# Patient Record
Sex: MALE | Race: WHITE | Employment: OTHER | URBAN - NONMETROPOLITAN AREA
[De-identification: names, ages, dates, MRNs, and addresses within clinical notes are randomized per-mention and may not be internally consistent; named-entity substitution may affect disease eponyms.]

---

## 2019-05-31 ENCOUNTER — HOSPITAL ENCOUNTER (OUTPATIENT)
Dept: GENERAL RADIOLOGY | Age: 54
Discharge: HOME | End: 2019-05-31
Payer: MEDICARE

## 2019-05-31 DIAGNOSIS — F17.200 TOBACCO USE DISORDER: ICD-10-CM

## 2019-05-31 PROCEDURE — 71046 X-RAY EXAM CHEST 2 VIEWS: CPT

## 2019-09-26 VITALS
BODY MASS INDEX: 17.23 KG/M2 | WEIGHT: 130 LBS | SYSTOLIC BLOOD PRESSURE: 100 MMHG | DIASTOLIC BLOOD PRESSURE: 60 MMHG | HEIGHT: 73 IN | HEART RATE: 69 BPM

## 2019-09-26 DIAGNOSIS — F17.200 NICOTINE DEPENDENCE, UNCOMPLICATED, UNSPECIFIED NICOTINE PRODUCT TYPE: ICD-10-CM

## 2019-09-26 DIAGNOSIS — D69.6 THROMBOCYTOPENIA, UNSPECIFIED (HCC): ICD-10-CM

## 2019-09-26 DIAGNOSIS — D75.1 POLYCYTHEMIA, SECONDARY: ICD-10-CM

## 2019-09-26 DIAGNOSIS — Z12.5 ENCOUNTER FOR SCREENING FOR MALIGNANT NEOPLASM OF PROSTATE: ICD-10-CM

## 2019-09-26 DIAGNOSIS — R76.9 ABNORMAL IMMUNOLOGICAL FINDING IN SERUM, UNSPECIFIED: ICD-10-CM

## 2019-09-26 DIAGNOSIS — B20 AIDS (ACQUIRED IMMUNE DEFICIENCY SYNDROME) (HCC): ICD-10-CM

## 2019-09-27 ENCOUNTER — OFFICE VISIT (OUTPATIENT)
Dept: HEMATOLOGY | Age: 54
End: 2019-09-27
Payer: MEDICARE

## 2019-09-27 ENCOUNTER — HOSPITAL ENCOUNTER (OUTPATIENT)
Dept: INFUSION THERAPY | Age: 54
Discharge: HOME | End: 2019-09-27
Payer: MEDICARE

## 2019-09-27 VITALS
SYSTOLIC BLOOD PRESSURE: 96 MMHG | WEIGHT: 127.3 LBS | DIASTOLIC BLOOD PRESSURE: 62 MMHG | HEIGHT: 73 IN | BODY MASS INDEX: 16.87 KG/M2

## 2019-09-27 DIAGNOSIS — D69.6 THROMBOCYTOPENIA, UNSPECIFIED (HCC): ICD-10-CM

## 2019-09-27 DIAGNOSIS — D75.1 POLYCYTHEMIA, SECONDARY: ICD-10-CM

## 2019-09-27 DIAGNOSIS — D75.1 POLYCYTHEMIA, SECONDARY: Primary | ICD-10-CM

## 2019-09-27 DIAGNOSIS — R76.8 ABNORMAL HEPATITIS SEROLOGY: ICD-10-CM

## 2019-09-27 PROCEDURE — G8419 CALC BMI OUT NRM PARAM NOF/U: HCPCS | Performed by: PHYSICIAN ASSISTANT

## 2019-09-27 PROCEDURE — G8427 DOCREV CUR MEDS BY ELIG CLIN: HCPCS | Performed by: PHYSICIAN ASSISTANT

## 2019-09-27 PROCEDURE — 85025 COMPLETE CBC W/AUTO DIFF WBC: CPT

## 2019-09-27 PROCEDURE — 99213 OFFICE O/P EST LOW 20 MIN: CPT | Performed by: PHYSICIAN ASSISTANT

## 2019-09-27 PROCEDURE — 3017F COLORECTAL CA SCREEN DOC REV: CPT | Performed by: PHYSICIAN ASSISTANT

## 2019-09-27 PROCEDURE — 4004F PT TOBACCO SCREEN RCVD TLK: CPT | Performed by: PHYSICIAN ASSISTANT

## 2019-09-27 RX ORDER — POTASSIUM CHLORIDE 20 MEQ/1
20 TABLET, EXTENDED RELEASE ORAL
COMMUNITY
Start: 2016-08-29

## 2019-09-27 RX ORDER — LEVETIRACETAM 750 MG/1
TABLET ORAL
Refills: 2 | COMMUNITY
Start: 2019-09-10

## 2019-09-27 RX ORDER — ABACAVIR SULFATE, DOLUTEGRAVIR SODIUM, LAMIVUDINE 600; 50; 300 MG/1; MG/1; MG/1
TABLET, FILM COATED ORAL
Refills: 2 | COMMUNITY
Start: 2019-08-30

## 2019-09-27 RX ORDER — ASPIRIN 81 MG/1
TABLET, COATED ORAL
Refills: 2 | COMMUNITY
Start: 2019-09-23

## 2019-09-27 RX ORDER — DIPHENHYDRAMINE HCL 25 MG
25 TABLET ORAL NIGHTLY PRN
COMMUNITY
End: 2021-02-01 | Stop reason: ALTCHOICE

## 2019-09-27 RX ORDER — NICOTINE 21 MG/24HR
1 PATCH, TRANSDERMAL 24 HOURS TRANSDERMAL
COMMUNITY
End: 2019-09-27

## 2019-09-27 RX ORDER — ATORVASTATIN CALCIUM 40 MG/1
TABLET, FILM COATED ORAL
Refills: 2 | COMMUNITY
Start: 2019-09-23

## 2019-09-27 RX ORDER — LOPERAMIDE HYDROCHLORIDE 2 MG/1
CAPSULE ORAL
Refills: 2 | COMMUNITY
Start: 2019-08-30

## 2019-09-27 RX ORDER — NITROGLYCERIN 0.4 MG/1
0.4 TABLET SUBLINGUAL
COMMUNITY

## 2019-09-27 RX ORDER — HYDROCODONE BITARTRATE AND ACETAMINOPHEN 7.5; 325 MG/1; MG/1
1 TABLET ORAL
COMMUNITY

## 2019-09-27 RX ORDER — ERGOCALCIFEROL (VITAMIN D2) 1250 MCG
50000 CAPSULE ORAL
COMMUNITY

## 2019-09-27 RX ORDER — PREGABALIN 150 MG/1
150 CAPSULE ORAL
COMMUNITY

## 2019-09-27 RX ORDER — CETIRIZINE HYDROCHLORIDE 10 MG/1
TABLET ORAL
Refills: 2 | COMMUNITY
Start: 2019-08-19

## 2019-09-27 ASSESSMENT — ENCOUNTER SYMPTOMS
NAUSEA: 0
EYE DISCHARGE: 0
ABDOMINAL DISTENTION: 0
WHEEZING: 0
COUGH: 1
EYE ITCHING: 0
DIARRHEA: 0
BLOOD IN STOOL: 0
COLOR CHANGE: 0
CONSTIPATION: 0
ABDOMINAL PAIN: 0
SORE THROAT: 0
PHOTOPHOBIA: 0
BACK PAIN: 1
SHORTNESS OF BREATH: 0
VOICE CHANGE: 0
TROUBLE SWALLOWING: 0
VOMITING: 0

## 2020-01-30 NOTE — PROGRESS NOTES
capsule, Take 150 mg by mouth., Disp: , Rfl:     ASPIRIN LOW DOSE 81 MG EC tablet, TAKE 1 TABLET BY MOUTH DAILY, Disp: , Rfl: 2    cetirizine (ZYRTEC) 10 MG tablet, TAKE 1 TABLET BY MOUTH DAILY, Disp: , Rfl: 2    metoprolol tartrate (LOPRESSOR) 25 MG tablet, TAKE 1 TABLET BY MOUTH TWICE DAILY, Disp: , Rfl: 2    loperamide (IMODIUM) 2 MG capsule, TAKE 1 CAPSULE BY MOUTH THREE TIMES DAILY BEFORE MEALS, Disp: , Rfl: 2    potassium chloride (KLOR-CON M) 20 MEQ extended release tablet, Take 20 mEq by mouth, Disp: , Rfl:     levETIRAcetam (KEPPRA) 750 MG tablet, TAKE 2 TABLETS BY MOUTH TWICE DAILY, Disp: , Rfl: 2    atorvastatin (LIPITOR) 40 MG tablet, TAKE 1 TABLET BY MOUTH DAILY, Disp: , Rfl: 2    Omega-3 Fatty Acids (RA FISH OIL) 900 MG CAPS, TAKE 2 CAPSULE BY MOUTH TWICE DAILY, Disp: , Rfl:     nitroGLYCERIN (NITROSTAT) 0.4 MG SL tablet, Place 0.4 mg under the tongue, Disp: , Rfl:     diphenhydrAMINE (BANOPHEN) 25 MG tablet, Take 25 mg by mouth nightly as needed for Itching, Disp: , Rfl:     ergocalciferol (DRISDOL) 52158 units capsule, Take 50,000 Units by mouth once a week, Disp: , Rfl:         Allergies   Allergen Reactions    Fenofibrate     Niacin And Related           Social History     Tobacco Use    Smoking status: Current Some Day Smoker     Last attempt to quit: 2018     Years since quittin.8    Smokeless tobacco: Never Used   Substance Use Topics    Alcohol use: Not on file    Drug use: Not on file       Family History   Problem Relation Age of Onset    No Known Problems Mother     No Known Problems Father        Subjective   REVIEW OF SYSTEMS:   Review of Systems   Constitutional: Positive for fatigue. Negative for chills, diaphoresis, fever and unexpected weight change. HENT: Positive for congestion. Negative for mouth sores, nosebleeds, sore throat, trouble swallowing and voice change. Eyes: Negative for photophobia, discharge and itching. Respiratory: Positive for cough. Negative for shortness of breath and wheezing. Cardiovascular: Negative for chest pain, palpitations and leg swelling. Gastrointestinal: Negative for abdominal distention, abdominal pain, blood in stool, constipation, diarrhea, nausea and vomiting. Endocrine: Negative for cold intolerance, heat intolerance, polydipsia and polyuria. Genitourinary: Negative for difficulty urinating, dysuria, hematuria and urgency. Musculoskeletal: Positive for arthralgias and back pain. Negative for joint swelling and myalgias. Skin: Negative for color change and rash. Neurological: Positive for weakness (Chronic right side mild paralysis). Negative for dizziness, tremors, seizures, syncope and light-headedness. Hematological: Negative for adenopathy. Does not bruise/bleed easily. Psychiatric/Behavioral: Negative for behavioral problems and suicidal ideas. The patient is nervous/anxious. All other systems reviewed and are negative. Objective   /74   Pulse 66   Ht 6' 1\" (1.854 m)   Wt 139 lb 8 oz (63.3 kg)   SpO2 98%   BMI 18.40 kg/m²     PHYSICAL EXAM:  Physical Exam  Vitals signs reviewed. Constitutional:       General: He is not in acute distress. Comments: Thin,   HENT:      Head: Normocephalic and atraumatic. Nose: Nose normal.   Eyes:      General: No scleral icterus. Conjunctiva/sclera: Conjunctivae normal.   Neck:      Vascular: No JVD. Trachea: No tracheal deviation. Cardiovascular:      Rate and Rhythm: Normal rate and regular rhythm. Heart sounds: Normal heart sounds. No murmur. Pulmonary:      Effort: Pulmonary effort is normal. No respiratory distress. Breath sounds: Normal breath sounds. No wheezing. Abdominal:      General: Bowel sounds are normal. There is no distension. Palpations: Abdomen is soft. There is no mass. Tenderness: There is no abdominal tenderness. Musculoskeletal:         General: Deformity (Right arm) present.  No tenderness. Skin:     Findings: No erythema or rash. Neurological:      Mental Status: He is alert and oriented to person, place, and time. Motor: Abnormal muscle tone (Right arm and leg) present. Coordination: Coordination abnormal.   Psychiatric:         Thought Content: Thought content normal.         VISIT DIAGNOSIS  1. Polycythemia, secondary    2. Thrombocytopenia, unspecified (HCC)        ASSESSMENT/PLAN:      1. Secondary polycythemia. He continues to smoke but has \"cut back\". HCT today is 52.4 and he does not require a phlebotomy. 2. Thrombocytopenia. PLT is actually normal today at 164,000. 3.  Colon cancer screening. Colonoscopy is up-to-date performed in 2017 with 10-year follow-up planned. 4.  Prostate cancer screening. PSA 5/31/2019 was normal at 0.9.    5.  Tobacco abuse. Unfortunately continues to smoke. 2 view chest x-ray 5/31/2019 revealed emphysema with mild interstitial edema. Pleural/parenchymal calcifications in the upper and midlung zones on the right without dominant mass. Return in about 4 months (around 5/31/2020) for With Ana Maria Charles PA-C  9:06 AM  1/31/2020

## 2020-01-31 ENCOUNTER — HOSPITAL ENCOUNTER (OUTPATIENT)
Dept: INFUSION THERAPY | Age: 55
Discharge: HOME | End: 2020-01-31
Payer: MEDICARE

## 2020-01-31 ENCOUNTER — OFFICE VISIT (OUTPATIENT)
Dept: HEMATOLOGY | Age: 55
End: 2020-01-31
Payer: MEDICARE

## 2020-01-31 VITALS
WEIGHT: 139.5 LBS | BODY MASS INDEX: 18.49 KG/M2 | OXYGEN SATURATION: 98 % | SYSTOLIC BLOOD PRESSURE: 108 MMHG | DIASTOLIC BLOOD PRESSURE: 74 MMHG | HEART RATE: 66 BPM | HEIGHT: 73 IN

## 2020-01-31 PROCEDURE — 99213 OFFICE O/P EST LOW 20 MIN: CPT | Performed by: PHYSICIAN ASSISTANT

## 2020-01-31 PROCEDURE — G8419 CALC BMI OUT NRM PARAM NOF/U: HCPCS | Performed by: PHYSICIAN ASSISTANT

## 2020-01-31 PROCEDURE — 85025 COMPLETE CBC W/AUTO DIFF WBC: CPT

## 2020-01-31 PROCEDURE — G8484 FLU IMMUNIZE NO ADMIN: HCPCS | Performed by: PHYSICIAN ASSISTANT

## 2020-01-31 PROCEDURE — 3017F COLORECTAL CA SCREEN DOC REV: CPT | Performed by: PHYSICIAN ASSISTANT

## 2020-01-31 PROCEDURE — 4004F PT TOBACCO SCREEN RCVD TLK: CPT | Performed by: PHYSICIAN ASSISTANT

## 2020-01-31 PROCEDURE — 99212 OFFICE O/P EST SF 10 MIN: CPT

## 2020-01-31 PROCEDURE — G8427 DOCREV CUR MEDS BY ELIG CLIN: HCPCS | Performed by: PHYSICIAN ASSISTANT

## 2020-01-31 RX ORDER — GUAIFENESIN 600 MG/1
1200 TABLET, EXTENDED RELEASE ORAL 2 TIMES DAILY
COMMUNITY
End: 2021-12-14

## 2020-01-31 RX ORDER — NICOTINE 21 MG/24HR
1 PATCH, TRANSDERMAL 24 HOURS TRANSDERMAL EVERY 24 HOURS
COMMUNITY
End: 2020-06-01

## 2020-01-31 ASSESSMENT — ENCOUNTER SYMPTOMS
COUGH: 1
DIARRHEA: 0
NAUSEA: 0
SHORTNESS OF BREATH: 0
EYE DISCHARGE: 0
VOICE CHANGE: 0
EYE ITCHING: 0
COLOR CHANGE: 0
TROUBLE SWALLOWING: 0
ABDOMINAL PAIN: 0
ABDOMINAL DISTENTION: 0
BACK PAIN: 1
SORE THROAT: 0
CONSTIPATION: 0
WHEEZING: 0
VOMITING: 0
PHOTOPHOBIA: 0
BLOOD IN STOOL: 0

## 2020-05-28 NOTE — PROGRESS NOTES
hours after Calcium, Antacids or Vitamins. , Disp: , Rfl: 2    HYDROcodone-acetaminophen (NORCO) 7.5-325 MG per tablet, Take 1 tablet by mouth., Disp: , Rfl:     pregabalin (LYRICA) 150 MG capsule, Take 150 mg by mouth., Disp: , Rfl:     ASPIRIN LOW DOSE 81 MG EC tablet, TAKE 1 TABLET BY MOUTH DAILY, Disp: , Rfl: 2    cetirizine (ZYRTEC) 10 MG tablet, TAKE 1 TABLET BY MOUTH DAILY, Disp: , Rfl: 2    metoprolol tartrate (LOPRESSOR) 25 MG tablet, TAKE 1 TABLET BY MOUTH TWICE DAILY, Disp: , Rfl: 2    loperamide (IMODIUM) 2 MG capsule, TAKE 1 CAPSULE BY MOUTH THREE TIMES DAILY BEFORE MEALS, Disp: , Rfl: 2    potassium chloride (KLOR-CON M) 20 MEQ extended release tablet, Take 20 mEq by mouth, Disp: , Rfl:     levETIRAcetam (KEPPRA) 750 MG tablet, TAKE 2 TABLETS BY MOUTH TWICE DAILY, Disp: , Rfl: 2    atorvastatin (LIPITOR) 40 MG tablet, TAKE 1 TABLET BY MOUTH DAILY, Disp: , Rfl: 2    Omega-3 Fatty Acids (RA FISH OIL) 900 MG CAPS, TAKE 2 CAPSULE BY MOUTH TWICE DAILY, Disp: , Rfl:     nitroGLYCERIN (NITROSTAT) 0.4 MG SL tablet, Place 0.4 mg under the tongue, Disp: , Rfl:     diphenhydrAMINE (BANOPHEN) 25 MG tablet, Take 25 mg by mouth nightly as needed for Itching, Disp: , Rfl:     ergocalciferol (DRISDOL) 63249 units capsule, Take 50,000 Units by mouth once a week, Disp: , Rfl:         Allergies   Allergen Reactions    Fenofibrate     Niacin And Related           Social History     Tobacco Use    Smoking status: Current Some Day Smoker     Last attempt to quit: 2018     Years since quittin.1    Smokeless tobacco: Never Used   Substance Use Topics    Alcohol use: Not on file    Drug use: Not on file       Family History   Problem Relation Age of Onset    No Known Problems Mother     No Known Problems Father        Subjective   REVIEW OF SYSTEMS:   Review of Systems   Constitutional: Positive for fatigue. Negative for chills, diaphoresis, fever and unexpected weight change.    HENT: Positive for

## 2020-06-01 ENCOUNTER — OFFICE VISIT (OUTPATIENT)
Dept: HEMATOLOGY | Age: 55
End: 2020-06-01
Payer: MEDICARE

## 2020-06-01 ENCOUNTER — HOSPITAL ENCOUNTER (OUTPATIENT)
Dept: INFUSION THERAPY | Age: 55
Discharge: HOME | End: 2020-06-01
Payer: MEDICARE

## 2020-06-01 ENCOUNTER — HOSPITAL ENCOUNTER (OUTPATIENT)
Dept: GENERAL RADIOLOGY | Age: 55
Discharge: HOME | End: 2020-06-01
Payer: MEDICARE

## 2020-06-01 VITALS
WEIGHT: 135.1 LBS | OXYGEN SATURATION: 97 % | HEART RATE: 71 BPM | DIASTOLIC BLOOD PRESSURE: 70 MMHG | HEIGHT: 73 IN | TEMPERATURE: 98.4 F | SYSTOLIC BLOOD PRESSURE: 118 MMHG | BODY MASS INDEX: 17.91 KG/M2

## 2020-06-01 DIAGNOSIS — D75.89 MACROCYTOSIS: ICD-10-CM

## 2020-06-01 DIAGNOSIS — D75.1 POLYCYTHEMIA, SECONDARY: ICD-10-CM

## 2020-06-01 DIAGNOSIS — D72.829 LEUKOCYTOSIS, UNSPECIFIED TYPE: ICD-10-CM

## 2020-06-01 DIAGNOSIS — Z12.5 ENCOUNTER FOR SCREENING FOR MALIGNANT NEOPLASM OF PROSTATE: ICD-10-CM

## 2020-06-01 LAB
ALBUMIN SERPL-MCNC: 4.8 G/DL (ref 3.5–5.2)
ALP BLD-CCNC: 155 U/L (ref 40–130)
ALT SERPL-CCNC: 34 U/L (ref 21–72)
ANION GAP SERPL CALCULATED.3IONS-SCNC: 11 MMOL/L (ref 7–19)
AST SERPL-CCNC: 28 U/L (ref 17–59)
BASOPHILS ABSOLUTE: 0.06 K/UL (ref 0.01–0.08)
BASOPHILS RELATIVE PERCENT: 0.4 % (ref 0.1–1.2)
BILIRUB SERPL-MCNC: 0.5 MG/DL (ref 0.2–1.3)
BUN BLDV-MCNC: 12 MG/DL (ref 9–20)
CALCIUM SERPL-MCNC: 9.4 MG/DL (ref 8.4–10.2)
CHLORIDE BLD-SCNC: 107 MMOL/L (ref 98–111)
CO2: 25 MMOL/L (ref 22–29)
CREAT SERPL-MCNC: 1.2 MG/DL (ref 0.6–1.2)
EOSINOPHILS ABSOLUTE: 0.32 K/UL (ref 0.04–0.54)
EOSINOPHILS RELATIVE PERCENT: 2.4 % (ref 0.7–7)
FOLATE: 12.4 NG/ML (ref 2.7–20)
GFR NON-AFRICAN AMERICAN: >60
GLOBULIN: 2.7 G/DL
GLUCOSE BLD-MCNC: 65 MG/DL (ref 74–106)
HCT VFR BLD CALC: 52.6 % (ref 40.1–51)
HEMOGLOBIN: 17.7 G/DL (ref 13.7–17.5)
LYMPHOCYTES ABSOLUTE: 6.86 K/UL (ref 1.18–3.74)
LYMPHOCYTES RELATIVE PERCENT: 51.4 % (ref 19.3–53.1)
MCH RBC QN AUTO: 35.3 PG (ref 25.7–32.2)
MCHC RBC AUTO-ENTMCNC: 33.7 G/DL (ref 32.3–36.5)
MCV RBC AUTO: 104.8 FL (ref 79–92.2)
MONOCYTES ABSOLUTE: 0.94 K/UL (ref 0.24–0.82)
MONOCYTES RELATIVE PERCENT: 7 % (ref 4.7–12.5)
NEUTROPHILS ABSOLUTE: 5.17 K/UL (ref 1.56–6.13)
NEUTROPHILS RELATIVE PERCENT: 38.8 % (ref 34–71.1)
PDW BLD-RTO: 13.9 % (ref 11.6–14.4)
PLATELET # BLD: 147 K/UL (ref 163–337)
PMV BLD AUTO: 11.9 FL (ref 7.4–10.4)
POTASSIUM SERPL-SCNC: 4.3 MMOL/L (ref 3.5–5.1)
PROSTATE SPECIFIC ANTIGEN: 1 NG/ML (ref 0–4)
RBC # BLD: 5.02 M/UL (ref 4.63–6.08)
SODIUM BLD-SCNC: 143 MMOL/L (ref 137–145)
TOTAL PROTEIN: 7.5 G/DL (ref 6.3–8.2)
VITAMIN B-12: 569 PG/ML (ref 239–931)
WBC # BLD: 13.35 K/UL (ref 4.23–9.07)

## 2020-06-01 PROCEDURE — 71046 X-RAY EXAM CHEST 2 VIEWS: CPT

## 2020-06-01 PROCEDURE — 84153 ASSAY OF PSA TOTAL: CPT

## 2020-06-01 PROCEDURE — 80053 COMPREHEN METABOLIC PANEL: CPT

## 2020-06-01 PROCEDURE — 82607 VITAMIN B-12: CPT

## 2020-06-01 PROCEDURE — G8427 DOCREV CUR MEDS BY ELIG CLIN: HCPCS | Performed by: PHYSICIAN ASSISTANT

## 2020-06-01 PROCEDURE — 85025 COMPLETE CBC W/AUTO DIFF WBC: CPT

## 2020-06-01 PROCEDURE — 99212 OFFICE O/P EST SF 10 MIN: CPT

## 2020-06-01 PROCEDURE — 4004F PT TOBACCO SCREEN RCVD TLK: CPT | Performed by: PHYSICIAN ASSISTANT

## 2020-06-01 PROCEDURE — 99214 OFFICE O/P EST MOD 30 MIN: CPT | Performed by: PHYSICIAN ASSISTANT

## 2020-06-01 PROCEDURE — 82746 ASSAY OF FOLIC ACID SERUM: CPT

## 2020-06-01 PROCEDURE — G8419 CALC BMI OUT NRM PARAM NOF/U: HCPCS | Performed by: PHYSICIAN ASSISTANT

## 2020-06-01 PROCEDURE — 3017F COLORECTAL CA SCREEN DOC REV: CPT | Performed by: PHYSICIAN ASSISTANT

## 2020-06-01 ASSESSMENT — ENCOUNTER SYMPTOMS
WHEEZING: 0
BACK PAIN: 1
VOICE CHANGE: 0
VOMITING: 0
COLOR CHANGE: 0
EYE DISCHARGE: 0
CONSTIPATION: 0
TROUBLE SWALLOWING: 0
NAUSEA: 0
ABDOMINAL PAIN: 0
ABDOMINAL DISTENTION: 0
BLOOD IN STOOL: 0
PHOTOPHOBIA: 0
COUGH: 1
SHORTNESS OF BREATH: 0
DIARRHEA: 0
EYE ITCHING: 0
SORE THROAT: 0

## 2020-10-02 NOTE — PROGRESS NOTES
Progress Note      Pt Name: Rosendo Jackson  YOB: 1965  MRN: [id number]    Date of evaluation: 10/5/2020  History Obtained From:  patient, electronic medical record    CHIEF COMPLAINT:    Chief Complaint   Patient presents with    Follow-up     Polycythemia, secondary      Current active problems  Secondary polycythemia   Thrombocytopenia    HISTORY OF PRESENT ILLNESS:    Rosendo Jackson is a 48 y.o.  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 He does require periodic therapeutic phlebotomies. He has responded well to the therapeutic phlebotomies when needed. He denies any new CVA symptoms. He continues on aspirin 81 daily. He denies any new headaches or chest pain. He denies any weight changes, night sweats, pruritus, extreme fatigue. He does have history of seizures but denies any recent seizure activity. He continues taking omega-3 fatty acids for cholesterol management. He reports that his HIV status is stable-undetectable. He continues on his antiretrovirals. He continues taking vitamin D replacement. Blood pressure is stable with his metoprolol. Seasonal allergies have improved with a change of the season but he continues to take Zyrtec as needed. 1st HEMATOLOGY HISTORY: Secondary polycythemia  Hung was seen in initial hematology consultation on 10/30/2017 referred by Dr. Mike Fortune for evaluation treatment of polycythemia. CBC from 9/18/2017 revealed a WBC of 11.2, Hgb 19.5, HCT 55.2, ,000. SEROLOGY 9/18/2017  ALVARO 2 - negative for V617F and exon 14 mutation  CALR - negative  MPL - negative    CMP- CRT 1.45 with GFR 55 otherwise negative    He does smoke one half pack cigarettes per day. His initial CBC in the office revealed a WBC of 12.18 normal percent differential, Hgb 18.8, HCT 57.5, .2, ,000.     Serologic evaluation has been requested looking into his polycythemia which is most likely a secondary polycythemia secondary to tobacco abuse. We'll also look into the macrocytosis and mild thrombocytopenia. Therapeutic phlebotomies were initiated 250 cc. SEROLOGY 10/30/2017  B12 - 444  Folate - 11.1  EPO - 9.9  Serum Fe - 76  TIBC - 356  Fe Sat - 21%  Ferritin - 145    TREATMENT SUMMARY  1. Therapeutic phlebotomy initiated 10/30/2017    2nd HEMATOLOGY HISTORY: Thrombocytopenia  He was seen in routine follow-up in the office on 2/19/2018 and his PLT had dropped to 147,000. SEROLOGY 2/19/2018   Copper - 120  Zinc - 71  SPEP - No M spike  Antiplatelet AB - Negative  MILAD positive RNP-1.6 (0-0.9) - appointment was made with Dr. Denis Sawyer however he did not keep the appointment and ultimately chose not to go. Ultrasound spleen at [facility] on 5/21/2018 reveals borderline splenomegaly measuring 12.2 x 5.7 x 4.9 cm.     Hepatitis A, B, C - A and C negative; B core antigen positive - followed by Dr. Lois Tomas    Serology 6/1/2020  CMP - alk phos 155  B12 - 569  Folate - 12.4      Past Medical History:   Diagnosis Date    Abnormal immunological finding in serum, unspecified     Adult BMI <19 kg/sq m     AIDS (acquired immune deficiency syndrome) (HCC)     Anxiety and depression     CAD (coronary artery disease)     MI    Carotid stenosis     CVA (cerebral vascular accident) ([address]     Residual right-sided weakness and expressive aphasia    MI (myocardial infarction) ([address]     Nicotine dependence, unspecified, uncomplicated     Polycythemia, secondary     Positive MILAD (antinuclear antibody)     RNP, referred to rheumatology but did not keep appointment    Seizure ([address]     Thrombocytopenia, unspecified ([address]     Vitamin D deficiency        Past Surgical History:   Procedure Laterality Date    COLONOSCOPY  01/31/2017       Current Outpatient Medications:     BANOPHEN 25 MG capsule, TAKE 2 CAPSULES BY MOUTH AT BEDTIME AS NEEDED, Disp: , Rfl:     guaiFENesin (MUCINEX) 600 MG extended release tablet, Take 1,200 mg by mouth 2 times daily, Disp: , Rfl:     TRIUMEQ 600- MG TABS, 1 tablet by mouth daily; Take 2 hours before or 6 hours after Calcium, Antacids or Vitamins. , Disp: , Rfl: 2    HYDROcodone-acetaminophen (NORCO) 7.5-325 MG per tablet, Take 1 tablet by mouth., Disp: , Rfl:     pregabalin (LYRICA) 150 MG capsule, Take 150 mg by mouth., Disp: , Rfl:     ASPIRIN LOW DOSE 81 MG EC tablet, TAKE 1 TABLET BY MOUTH DAILY, Disp: , Rfl: 2    cetirizine (ZYRTEC) 10 MG tablet, TAKE 1 TABLET BY MOUTH DAILY, Disp: , Rfl: 2    metoprolol tartrate (LOPRESSOR) 25 MG tablet, TAKE 1 TABLET BY MOUTH TWICE DAILY, Disp: , Rfl: 2    loperamide (IMODIUM) 2 MG capsule, TAKE 1 CAPSULE BY MOUTH THREE TIMES DAILY BEFORE MEALS, Disp: , Rfl: 2    potassium chloride (KLOR-CON M) 20 MEQ extended release tablet, Take 20 mEq by mouth, Disp: , Rfl:     levETIRAcetam (KEPPRA) 750 MG tablet, TAKE 2 TABLETS BY MOUTH TWICE DAILY, Disp: , Rfl: 2    atorvastatin (LIPITOR) 40 MG tablet, TAKE 1 TABLET BY MOUTH DAILY, Disp: , Rfl: 2    Omega-3 Fatty Acids (RA FISH OIL) 900 MG CAPS, TAKE 2 CAPSULE BY MOUTH TWICE DAILY, Disp: , Rfl:     nitroGLYCERIN (NITROSTAT) 0.4 MG SL tablet, Place 0.4 mg under the tongue, Disp: , Rfl:     diphenhydrAMINE (BANOPHEN) 25 MG tablet, Take 25 mg by mouth nightly as needed for Itching, Disp: , Rfl:     ergocalciferol (DRISDOL) 39997 units capsule, Take 50,000 Units by mouth once a week, Disp: , Rfl:         Allergies   Allergen Reactions    Fenofibrate     Niacin And Related           Social History     Tobacco Use    Smoking status: Current Some Day Smoker     Last attempt to quit: 2018     Years since quittin.5    Smokeless tobacco: Never Used   Substance Use Topics    Alcohol use: Not on file    Drug use: Not on file       Family History   Problem Relation Age of Onset    No Known Problems Mother     No Known Problems Father        Subjective   REVIEW OF SYSTEMS: Review of Systems   Constitutional: Positive for fatigue (Mild). Negative for chills, diaphoresis, fever and unexpected weight change. HENT: Negative for congestion, mouth sores, nosebleeds, sore throat, trouble swallowing and voice change. Eyes: Negative for photophobia, discharge and itching. Respiratory: Negative for cough, shortness of breath and wheezing. Cardiovascular: Negative for chest pain, palpitations and leg swelling. Gastrointestinal: Negative for abdominal distention, abdominal pain, blood in stool, constipation, diarrhea, nausea and vomiting. Endocrine: Negative for cold intolerance, heat intolerance, polydipsia and polyuria. Genitourinary: Negative for difficulty urinating, dysuria, hematuria and urgency. Musculoskeletal: Positive for arthralgias and back pain. Negative for joint swelling and myalgias. Skin: Negative for color change and rash. Neurological: Positive for weakness (Chronic right side mild paralysis). Negative for dizziness, tremors, seizures, syncope and light-headedness. Hematological: Negative for adenopathy. Does not bruise/bleed easily. Psychiatric/Behavioral: Negative for behavioral problems and suicidal ideas. The patient is not nervous/anxious. All other systems reviewed and are negative. Objective   /80   Pulse 75   Temp 97.3 °F (36.3 °C)   Ht 6' 1\" (1.854 m)   Wt 129 lb 4.8 oz (58.7 kg)   SpO2 96%   BMI 17.06 kg/m²     PHYSICAL EXAM:  Physical Exam  Vitals signs reviewed. Constitutional:       General: He is not in acute distress. Comments: Thin,   HENT:      Head: Normocephalic and atraumatic. Nose: Nose normal.   Eyes:      General: No scleral icterus. Conjunctiva/sclera: Conjunctivae normal.   Neck:      Vascular: No JVD. Trachea: No tracheal deviation. Cardiovascular:      Rate and Rhythm: Normal rate and regular rhythm. Heart sounds: Normal heart sounds. No murmur.    Pulmonary:      Effort: Pulmonary effort is normal. No respiratory distress. Breath sounds: Normal breath sounds. No wheezing. Abdominal:      General: Bowel sounds are normal. There is no distension. Palpations: Abdomen is soft. There is no hepatomegaly, splenomegaly or mass. Tenderness: There is no abdominal tenderness. Musculoskeletal:         General: Deformity (Right arm) present. No tenderness. Lymphadenopathy:      Cervical: No cervical adenopathy. Upper Body:      Right upper body: No supraclavicular or axillary adenopathy. Left upper body: No supraclavicular or axillary adenopathy. Lower Body: No right inguinal adenopathy. No left inguinal adenopathy. Skin:     Findings: No erythema or rash. Neurological:      Mental Status: He is alert and oriented to person, place, and time. Motor: Abnormal muscle tone (Right arm and leg) present. Coordination: Coordination abnormal.   Psychiatric:         Thought Content: Thought content normal.       Narrative    XR CHEST (2 VW)    6/1/2020 9:19 AM    History: Stroke patient. Smoking history. Two-view chest x-ray compared with 5/31/2019. Upper right lung calcifications are unchanged. Chronic interstitial disease. No pleural effusion or pneumothorax. No focal infiltrate is seen. Normal heart size.         Impression    1. Chronic lung changes with no acute abnormality. Signed by Dr Jahaira Che on 6/1/2020 9:20 AM      Lab Results   Component Value Date    WBC 15.66 (H) 10/05/2020    HGB 18.8 (HH) 10/05/2020    HCT 56.5 (HH) 10/05/2020    .8 (H) 10/05/2020     (L) 10/05/2020       VISIT DIAGNOSIS  1. Polycythemia, secondary    2. Thrombocytopenia, unspecified (HCC)    3. Leukocytosis, unspecified type    4. Lymphocytosis        ASSESSMENT/PLAN:      1. Secondary polycythemia. HCT is up to 56.5 and he will be phlebotomized today due to prior CVA issues. 2. Thrombocytopenia.      Serology 6/1/2020  CMP - alk phos 155  B12 - 569  Folate - 12.4    PLT is stable at 144,000. 3.  Leukocytosis. ESR - 7  CRP - 18 (0-10)  BCR/ABL - no mutation detected ([facility])    WBC is 15.66 with differential switching around to 50.3% lymphocytes today and 39.2% neutrophils. He does not have any B symptoms. Physical examination does not reveal any peripheral lymphadenopathy, splenomegaly. Peripheral blood flow cytometry for CLL to [facility] was requested    4. Colon cancer screening. Colonoscopy is up-to-date performed in 2017 with 10-year follow-up planned. 5.  Prostate cancer screening. PSA 6/1/2020 was normal at 1.0.      5.  Nicotine dependence. Unfortunately continues to smoke 6  cigarettes/day. I again encouraged smoking cessation. 2 view chest x-ray 6/1/2020 revealed chronic lung changes with no acute abnormality     I again discussed the fact that when he turns 56 we will consider low-dose screening CTs. Orders Placed This Encounter   Procedures    Miscellaneous Sendout 1   · Therapeutic phlebotomy-500 cc today      Return in about 4 months (around 2/5/2021) for With [facility].      Martin Clayton PA-C  8:21 AM  10/5/2020

## 2020-10-05 ENCOUNTER — HOSPITAL ENCOUNTER (OUTPATIENT)
Dept: INFUSION THERAPY | Age: 55
Discharge: HOME | End: 2020-10-05
Payer: MEDICARE

## 2020-10-05 ENCOUNTER — OFFICE VISIT (OUTPATIENT)
Dept: HEMATOLOGY | Age: 55
End: 2020-10-05
Payer: MEDICARE

## 2020-10-05 VITALS
HEART RATE: 75 BPM | SYSTOLIC BLOOD PRESSURE: 116 MMHG | OXYGEN SATURATION: 96 % | WEIGHT: 129.3 LBS | TEMPERATURE: 97.3 F | HEIGHT: 73 IN | BODY MASS INDEX: 17.14 KG/M2 | DIASTOLIC BLOOD PRESSURE: 80 MMHG

## 2020-10-05 DIAGNOSIS — D75.1 POLYCYTHEMIA, SECONDARY: ICD-10-CM

## 2020-10-05 LAB
BASOPHILS ABSOLUTE: 0.06 K/UL (ref 0.01–0.08)
BASOPHILS RELATIVE PERCENT: 0.4 % (ref 0.1–1.2)
EOSINOPHILS ABSOLUTE: 0.42 K/UL (ref 0.04–0.54)
EOSINOPHILS RELATIVE PERCENT: 2.7 % (ref 0.7–7)
HCT VFR BLD CALC: 56.5 % (ref 40.1–51)
HEMOGLOBIN: 18.8 G/DL (ref 13.7–17.5)
LYMPHOCYTES ABSOLUTE: 7.88 K/UL (ref 1.18–3.74)
LYMPHOCYTES RELATIVE PERCENT: 50.3 % (ref 19.3–53.1)
MCH RBC QN AUTO: 35.5 PG (ref 25.7–32.2)
MCHC RBC AUTO-ENTMCNC: 33.3 G/DL (ref 32.3–36.5)
MCV RBC AUTO: 106.8 FL (ref 79–92.2)
MONOCYTES ABSOLUTE: 1.16 K/UL (ref 0.24–0.82)
MONOCYTES RELATIVE PERCENT: 7.4 % (ref 4.7–12.5)
NEUTROPHILS ABSOLUTE: 6.14 K/UL (ref 1.56–6.13)
NEUTROPHILS RELATIVE PERCENT: 39.2 % (ref 34–71.1)
PDW BLD-RTO: 13.9 % (ref 11.6–14.4)
PLATELET # BLD: 144 K/UL (ref 163–337)
PMV BLD AUTO: 11.9 FL (ref 7.4–10.4)
RBC # BLD: 5.29 M/UL (ref 4.63–6.08)
WBC # BLD: 15.66 K/UL (ref 4.23–9.07)

## 2020-10-05 PROCEDURE — 88185 FLOWCYTOMETRY/TC ADD-ON: CPT

## 2020-10-05 PROCEDURE — 88184 FLOWCYTOMETRY/ TC 1 MARKER: CPT

## 2020-10-05 PROCEDURE — G8484 FLU IMMUNIZE NO ADMIN: HCPCS | Performed by: PHYSICIAN ASSISTANT

## 2020-10-05 PROCEDURE — 99213 OFFICE O/P EST LOW 20 MIN: CPT | Performed by: PHYSICIAN ASSISTANT

## 2020-10-05 PROCEDURE — G8419 CALC BMI OUT NRM PARAM NOF/U: HCPCS | Performed by: PHYSICIAN ASSISTANT

## 2020-10-05 PROCEDURE — 99195 PHLEBOTOMY: CPT

## 2020-10-05 PROCEDURE — 85025 COMPLETE CBC W/AUTO DIFF WBC: CPT

## 2020-10-05 PROCEDURE — G8427 DOCREV CUR MEDS BY ELIG CLIN: HCPCS | Performed by: PHYSICIAN ASSISTANT

## 2020-10-05 PROCEDURE — 4004F PT TOBACCO SCREEN RCVD TLK: CPT | Performed by: PHYSICIAN ASSISTANT

## 2020-10-05 PROCEDURE — 3017F COLORECTAL CA SCREEN DOC REV: CPT | Performed by: PHYSICIAN ASSISTANT

## 2020-10-05 RX ORDER — DIPHENHYDRAMINE HYDROCHLORIDE 25 MG/1
CAPSULE ORAL
COMMUNITY
Start: 2020-07-29 | End: 2022-09-23 | Stop reason: ALTCHOICE

## 2020-10-05 ASSESSMENT — ENCOUNTER SYMPTOMS
VOICE CHANGE: 0
EYE ITCHING: 0
BACK PAIN: 1
CONSTIPATION: 0
COUGH: 0
DIARRHEA: 0
SORE THROAT: 0
COLOR CHANGE: 0
VOMITING: 0
ABDOMINAL PAIN: 0
NAUSEA: 0
EYE DISCHARGE: 0
TROUBLE SWALLOWING: 0
PHOTOPHOBIA: 0
ABDOMINAL DISTENTION: 0
WHEEZING: 0
SHORTNESS OF BREATH: 0
BLOOD IN STOOL: 0

## 2020-10-05 NOTE — PROGRESS NOTES
THERAPEUTIC PHLEBOTOMY  Most recent Hemoglobin 18.8 Gm/dl and Hematocrit 56.5%   Date obtained: 10 /5 /2020    Pre-phlebotomy Vital Signs: Refer to Doc flow sheet  Start time: 8:50  Tourniquet placed on patient's arm and arm palpated for venous access. Area of venous access cleansed with alcohol. Sheath removed from the needle and needle inserted into the left antecubital site. Blood flowing well down the tubing into collection bag. Adjustment/no adjustment of needle needed to maintain adequate blood flow. Scale monitoring amount of blood in bag. Stop Time: 9:10  Needle removed from patient's arm. Pressure applied to patient's arm until bleeding stopped. Dry sterile dressing applied over puncture site and secured with Coban/self-adherent wrap. Final amount of blood removed: 300  Post Vital Signs: Refer to Doc flow sheet    Patient tolerated procedure well. No redness, edema, or signs of active bleeding noted. Discharge Instructions provided: No heavy pushing or lifting for the next 24 hours. Keep dressing dry and in place for 4 to 6 hours. If a fever GREATER than 100.5 develops, contact office. Patient discharged ambulatory with belongings.

## 2021-01-31 NOTE — PROGRESS NOTES
Progress Note      Pt Name: Trina Lesches  YOB: 1965  MRN: [id number]    Date of evaluation: 2/1/2021  History Obtained From:  patient, electronic medical record    CHIEF COMPLAINT:    Chief Complaint   Patient presents with    Follow-up     Polycythemia, secondary     Current active problems  Secondary polycythemia   Thrombocytopenia    HISTORY OF PRESENT ILLNESS:    Trina Lesches is a 48 y.o.  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 He does require periodic therapeutic phlebotomies. He has responded well to the therapeutic phlebotomies when needed. He denies any new CVA symptoms. He continues on aspirin 81 daily. He denies any new headaches or chest pain. He denies any weight changes, night sweats, pruritus, extreme fatigue. He developed a wound on his right foot in November 2020. He has been going to wound care at [facility]. He does have underlying atherosclerotic peripheral vascular diseasePAD Dr. Jeovany Luque is working up potential iliofemoral disease. Aideia Jaren reports that he most likely will have to have an extensive bypass surgery on his legs. He is seeing Dr. Shanda Patricia for cardiac clearance prior to the procedure. He has a history of seizures but has not had any recent seizure activity. He reports that his HIV status has been stableundetectable. He continues on his antiretrovirals. Blood pressure is stable on his metoprolol. He continues treating his cholesterol. Unfortunately continues to smoke 1/2 pack/day. 1st HEMATOLOGY HISTORY: Secondary polycythemia  Hung was seen in initial hematology consultation on 10/30/2017 referred by Dr. Brianna Wilkerson for evaluation treatment of polycythemia. CBC from 9/18/2017 revealed a WBC of 11.2, Hgb 19.5, HCT 55.2, ,000.     SEROLOGY 9/18/2017  ALVARO 2 - negative for V617F and exon 14 mutation  CALR - negative  MPL - negative    CMP- CRT 1.45 with GFR 55 otherwise negative    He does smoke one half pack cigarettes per day. His initial CBC in the office revealed a WBC of 12.18 normal percent differential, Hgb 18.8, HCT 57.5, .2, ,000. Serologic evaluation has been requested looking into his polycythemia which is most likely a secondary polycythemia secondary to tobacco abuse. We'll also look into the macrocytosis and mild thrombocytopenia. Therapeutic phlebotomies were initiated 250 cc. SEROLOGY 10/30/2017  B12 - 444  Folate - 11.1  EPO - 9.9  Serum Fe - 76  TIBC - 356  Fe Sat - 21%  Ferritin - 145    TREATMENT SUMMARY  1. Therapeutic phlebotomy initiated 10/30/2017    2nd HEMATOLOGY HISTORY: Thrombocytopenia  He was seen in routine follow-up in the office on 2/19/2018 and his PLT had dropped to 147,000. SEROLOGY 2/19/2018   Copper - 120  Zinc - 71  SPEP - No M spike  Antiplatelet AB - Negative  MILAD positive RNP1.6 (0-0.9) - appointment was made with Dr. Sharon Rooney however he did not keep the appointment and ultimately chose not to go. Ultrasound spleen at [facility] on 5/21/2018 reveals borderline splenomegaly measuring 12.2 x 5.7 x 4.9 cm. Hepatitis A, B, C - A and C negative; B core antigen positive - followed by Dr. Juan Mcfadden    Serology 6/1/2020  CMP - alk phos 155  B12 - 569  Folate - 12.4    3rd HEMATOLOGY HISTORY: Leukocytosis (lymphocytosis)    CBC on 10/5/2020 revealed WBC 15.66 with differential switching around to 50.3% lymphocytes and 39.2% neutrophils. ESR - 7  CRP - 18 (0-10)  BCR/ABL - no mutation detected ([facility])          Peripheral blood flow cytometry to [facility] 10/5/2020  · No B-cell monoclonality  · Decreased CD4: CD8 ratio (0.36: 1) -most likely due to underlying known HIV  · No T-cell aberrant antigen expression.   (No increase in T-cell LGL)  · Blasts are not increased      Past Medical History:   Diagnosis Date    Abnormal immunological finding in serum, unspecified     Adult BMI <19 kg/sq m     AIDS (acquired 2 CAPSULE BY MOUTH TWICE DAILY, Disp: , Rfl:     nitroGLYCERIN (NITROSTAT) 0.4 MG SL tablet, Place 0.4 mg under the tongue, Disp: , Rfl:     ergocalciferol (DRISDOL) 08821 units capsule, Take 50,000 Units by mouth once a week, Disp: , Rfl:         Allergies   Allergen Reactions    Fenofibrate     Niacin And Related           Social History     Tobacco Use    Smoking status: Current Some Day Smoker     Last attempt to quit: 2018     Years since quittin.8    Smokeless tobacco: Never Used   Substance Use Topics    Alcohol use: Not on file    Drug use: Not on file       Family History   Problem Relation Age of Onset    No Known Problems Mother     No Known Problems Father        Subjective   REVIEW OF SYSTEMS:   Review of Systems   Constitutional: Positive for fatigue (Mild). Negative for chills, diaphoresis, fever and unexpected weight change. HENT: Negative for congestion, mouth sores, nosebleeds, sore throat, trouble swallowing and voice change. Eyes: Negative for photophobia, discharge and itching. Respiratory: Negative for cough, shortness of breath and wheezing. Cardiovascular: Negative for chest pain, palpitations and leg swelling. Gastrointestinal: Negative for abdominal distention, abdominal pain, blood in stool, constipation, diarrhea, nausea and vomiting. Endocrine: Negative for cold intolerance, heat intolerance, polydipsia and polyuria. Genitourinary: Negative for difficulty urinating, dysuria, hematuria and urgency. Musculoskeletal: Positive for arthralgias and back pain. Negative for joint swelling and myalgias. Skin: Positive for wound (Right foot). Negative for color change and rash. Neurological: Positive for weakness (Chronic right side mild paralysis). Negative for dizziness, tremors, seizures, syncope and light-headedness. Hematological: Negative for adenopathy. Does not bruise/bleed easily.    Psychiatric/Behavioral: Negative for behavioral problems and suicidal ideas. The patient is not nervous/anxious. All other systems reviewed and are negative. Objective   /62   Pulse 76   Temp 96.9 °F (36.1 °C) (Temporal)   Ht 6' 1\" (1.854 m)   Wt 126 lb 4.8 oz (57.3 kg)   SpO2 96%   BMI 16.66 kg/m²     PHYSICAL EXAM:  Physical Exam  Vitals signs reviewed. Constitutional:       General: He is not in acute distress. Comments: Thin,   HENT:      Head: Normocephalic and atraumatic. Nose: Nose normal.   Eyes:      General: No scleral icterus. Conjunctiva/sclera: Conjunctivae normal.   Neck:      Vascular: No JVD. Trachea: No tracheal deviation. Cardiovascular:      Rate and Rhythm: Normal rate and regular rhythm. Heart sounds: Normal heart sounds. No murmur. Pulmonary:      Effort: Pulmonary effort is normal. No respiratory distress. Breath sounds: Normal breath sounds. No wheezing. Abdominal:      General: Bowel sounds are normal. There is no distension. Palpations: Abdomen is soft. There is no hepatomegaly, splenomegaly or mass. Tenderness: There is no abdominal tenderness. Musculoskeletal:         General: Deformity (Right arm) present. No tenderness. Lymphadenopathy:      Cervical: No cervical adenopathy. Upper Body:      Right upper body: No supraclavicular or axillary adenopathy. Left upper body: No supraclavicular or axillary adenopathy. Lower Body: No right inguinal adenopathy. No left inguinal adenopathy. Skin:     Findings: No erythema or rash. Neurological:      Mental Status: He is alert and oriented to person, place, and time. Motor: Abnormal muscle tone (Right arm and leg) present. Coordination: Coordination abnormal.   Psychiatric:         Thought Content:  Thought content normal.          CBC reviewed by me  Lab Results   Component Value Date    WBC 9.56 (H) 02/01/2021    HGB 16.7 02/01/2021    HCT 49.0 02/01/2021    .7 (H) 02/01/2021     (L) 02/01/2021 VISIT DIAGNOSIS  1. Polycythemia, secondary    2. Thrombocytopenia, unspecified (HCC)    3. Lymphocytosis    4. Cigarette nicotine dependence without complication        ASSESSMENT/PLAN:      1. Secondary polycythemia. HCT is 52 - He doesn't require a therapeutic phlebotomy today. No new CVA symptoms. 2. Thrombocytopenia. Serology 6/1/2020  CMP - alk phos 155  B12 - 569  Folate - 12.4    PLT is stable at 139,000.    3.  Leukocytosis. ESR - 7  CRP - 18 (0-10)  BCR/ABL - no mutation detected ([facility])    CBC on 10/5/2020 revealed WBC 15.66 with differential switching around to 50.3% lymphocytes and 39.2% neutrophils. He does not have any B symptoms. Physical examination does not reveal any peripheral lymphadenopathy, splenomegaly. Peripheral blood flow cytometry to [facility] 10/5/2020  · No B-cell monoclonality  · Decreased CD4: CD8 ratio (0.36: 1) -most likely due to underlying known HIV  · No T-cell aberrant antigen expression. (No increase in T-cell LGL)  · Blasts are not increased    WBC today is actually better at 9.56 with 50.3% neutrophil, 40.2% lymphocytes, 6.5% monocyte. 4.  Colon cancer screening. Colonoscopy is up-to-date performed in 2017 with 10-year follow-up planned. 5.  Prostate cancer screening. PSA 6/1/2020 was normal at 1.0.      5.  Nicotine dependence. Unfortunately continues to smoke 6  cigarettes/day. I again encouraged smoking cessation. 2 view chest x-ray 6/1/2020 revealed chronic lung changes with no acute abnormality    He will be turning 56 in 4 days. I had previously discussed getting screening low-dose CT imaging because of his greater than 30-pack-year smoking. I again discussed CT imaging with him but we will hold on that imaging until after he gets his lower extremitiesarterial disease addressed. I encourage smoking cessation especially given the fact that he is requiring potential bypass surgery.           Return in about 4 months (around 6/1/2021) for With Trevin Chen and possible therapeutic phlebotomy.      Jelly Wheat PA-C  9:04 AM  2/1/2021

## 2021-02-01 ENCOUNTER — HOSPITAL ENCOUNTER (OUTPATIENT)
Dept: INFUSION THERAPY | Age: 56
Discharge: HOME | End: 2021-02-01
Payer: MEDICARE

## 2021-02-01 ENCOUNTER — OFFICE VISIT (OUTPATIENT)
Dept: HEMATOLOGY | Age: 56
End: 2021-02-01
Payer: MEDICARE

## 2021-02-01 VITALS
DIASTOLIC BLOOD PRESSURE: 62 MMHG | BODY MASS INDEX: 16.74 KG/M2 | TEMPERATURE: 96.9 F | WEIGHT: 126.3 LBS | HEART RATE: 76 BPM | SYSTOLIC BLOOD PRESSURE: 134 MMHG | OXYGEN SATURATION: 96 % | HEIGHT: 73 IN

## 2021-02-01 DIAGNOSIS — D75.1 POLYCYTHEMIA, SECONDARY: Primary | ICD-10-CM

## 2021-02-01 DIAGNOSIS — F17.210 CIGARETTE NICOTINE DEPENDENCE WITHOUT COMPLICATION: ICD-10-CM

## 2021-02-01 DIAGNOSIS — D75.1 POLYCYTHEMIA, SECONDARY: ICD-10-CM

## 2021-02-01 DIAGNOSIS — D69.6 THROMBOCYTOPENIA, UNSPECIFIED (HCC): ICD-10-CM

## 2021-02-01 DIAGNOSIS — D72.820 LYMPHOCYTOSIS: ICD-10-CM

## 2021-02-01 LAB
BASOPHILS ABSOLUTE: 0.03 K/UL (ref 0.01–0.08)
BASOPHILS RELATIVE PERCENT: 0.3 % (ref 0.1–1.2)
EOSINOPHILS ABSOLUTE: 0.26 K/UL (ref 0.04–0.54)
EOSINOPHILS RELATIVE PERCENT: 2.7 % (ref 0.7–7)
HCT VFR BLD CALC: 49 % (ref 40.1–51)
HEMOGLOBIN: 16.7 G/DL (ref 13.7–17.5)
LYMPHOCYTES ABSOLUTE: 3.84 K/UL (ref 1.18–3.74)
LYMPHOCYTES RELATIVE PERCENT: 40.2 % (ref 19.3–53.1)
MCH RBC QN AUTO: 35.7 PG (ref 25.7–32.2)
MCHC RBC AUTO-ENTMCNC: 34.1 G/DL (ref 32.3–36.5)
MCV RBC AUTO: 104.7 FL (ref 79–92.2)
MONOCYTES ABSOLUTE: 0.62 K/UL (ref 0.24–0.82)
MONOCYTES RELATIVE PERCENT: 6.5 % (ref 4.7–12.5)
NEUTROPHILS ABSOLUTE: 4.81 K/UL (ref 1.56–6.13)
NEUTROPHILS RELATIVE PERCENT: 50.3 % (ref 34–71.1)
PDW BLD-RTO: 14 % (ref 11.6–14.4)
PLATELET # BLD: 139 K/UL (ref 163–337)
PMV BLD AUTO: 12.2 FL (ref 7.4–10.4)
RBC # BLD: 4.68 M/UL (ref 4.63–6.08)
WBC # BLD: 9.56 K/UL (ref 4.23–9.07)

## 2021-02-01 PROCEDURE — 99213 OFFICE O/P EST LOW 20 MIN: CPT | Performed by: PHYSICIAN ASSISTANT

## 2021-02-01 PROCEDURE — 3017F COLORECTAL CA SCREEN DOC REV: CPT | Performed by: PHYSICIAN ASSISTANT

## 2021-02-01 PROCEDURE — 99211 OFF/OP EST MAY X REQ PHY/QHP: CPT

## 2021-02-01 PROCEDURE — G8484 FLU IMMUNIZE NO ADMIN: HCPCS | Performed by: PHYSICIAN ASSISTANT

## 2021-02-01 PROCEDURE — G8419 CALC BMI OUT NRM PARAM NOF/U: HCPCS | Performed by: PHYSICIAN ASSISTANT

## 2021-02-01 PROCEDURE — G8427 DOCREV CUR MEDS BY ELIG CLIN: HCPCS | Performed by: PHYSICIAN ASSISTANT

## 2021-02-01 PROCEDURE — 4004F PT TOBACCO SCREEN RCVD TLK: CPT | Performed by: PHYSICIAN ASSISTANT

## 2021-02-01 PROCEDURE — 85025 COMPLETE CBC W/AUTO DIFF WBC: CPT

## 2021-02-01 ASSESSMENT — ENCOUNTER SYMPTOMS
PHOTOPHOBIA: 0
VOICE CHANGE: 0
VOMITING: 0
EYE DISCHARGE: 0
SORE THROAT: 0
BACK PAIN: 1
ABDOMINAL PAIN: 0
NAUSEA: 0
COUGH: 0
ABDOMINAL DISTENTION: 0
BLOOD IN STOOL: 0
DIARRHEA: 0
COLOR CHANGE: 0
SHORTNESS OF BREATH: 0
TROUBLE SWALLOWING: 0
CONSTIPATION: 0
EYE ITCHING: 0
WHEEZING: 0

## 2021-05-27 DIAGNOSIS — D75.1 POLYCYTHEMIA, SECONDARY: Primary | ICD-10-CM

## 2021-06-01 ENCOUNTER — HOSPITAL ENCOUNTER (OUTPATIENT)
Dept: INFUSION THERAPY | Age: 56
End: 2021-06-01
Payer: MEDICARE

## 2021-06-22 ENCOUNTER — HOSPITAL ENCOUNTER (OUTPATIENT)
Dept: INFUSION THERAPY | Age: 56
Discharge: HOME | End: 2021-06-22
Payer: MEDICARE

## 2021-06-22 ENCOUNTER — OFFICE VISIT (OUTPATIENT)
Dept: HEMATOLOGY | Age: 56
End: 2021-06-22
Payer: MEDICARE

## 2021-06-22 VITALS
BODY MASS INDEX: 16.84 KG/M2 | WEIGHT: 127.1 LBS | HEIGHT: 73 IN | OXYGEN SATURATION: 97 % | DIASTOLIC BLOOD PRESSURE: 72 MMHG | SYSTOLIC BLOOD PRESSURE: 112 MMHG | HEART RATE: 86 BPM

## 2021-06-22 DIAGNOSIS — D72.829 LEUKOCYTOSIS, UNSPECIFIED TYPE: ICD-10-CM

## 2021-06-22 DIAGNOSIS — D69.6 THROMBOCYTOPENIA, UNSPECIFIED (HCC): ICD-10-CM

## 2021-06-22 DIAGNOSIS — D75.1 POLYCYTHEMIA, SECONDARY: ICD-10-CM

## 2021-06-22 DIAGNOSIS — D75.1 POLYCYTHEMIA, SECONDARY: Primary | ICD-10-CM

## 2021-06-22 DIAGNOSIS — F17.210 CIGARETTE NICOTINE DEPENDENCE WITHOUT COMPLICATION: ICD-10-CM

## 2021-06-22 LAB
BASOPHILS ABSOLUTE: 0.02 K/UL (ref 0.01–0.08)
BASOPHILS RELATIVE PERCENT: 0.2 % (ref 0.1–1.2)
EOSINOPHILS ABSOLUTE: 0.22 K/UL (ref 0.04–0.54)
EOSINOPHILS RELATIVE PERCENT: 2.2 % (ref 0.7–7)
HCT VFR BLD CALC: 51.7 % (ref 40.1–51)
HEMOGLOBIN: 16.9 G/DL (ref 13.7–17.5)
LYMPHOCYTES ABSOLUTE: 5.7 K/UL (ref 1.18–3.74)
LYMPHOCYTES RELATIVE PERCENT: 58.1 % (ref 19.3–53.1)
MCH RBC QN AUTO: 33.2 PG (ref 25.7–32.2)
MCHC RBC AUTO-ENTMCNC: 32.7 G/DL (ref 32.3–36.5)
MCV RBC AUTO: 101.6 FL (ref 79–92.2)
MONOCYTES ABSOLUTE: 0.81 K/UL (ref 0.24–0.82)
MONOCYTES RELATIVE PERCENT: 8.3 % (ref 4.7–12.5)
NEUTROPHILS ABSOLUTE: 3.06 K/UL (ref 1.56–6.13)
NEUTROPHILS RELATIVE PERCENT: 31.2 % (ref 34–71.1)
PDW BLD-RTO: 14.6 % (ref 11.6–14.4)
PLATELET # BLD: 180 K/UL (ref 163–337)
PMV BLD AUTO: 11.5 FL (ref 7.4–10.4)
RBC # BLD: 5.09 M/UL (ref 4.63–6.08)
WBC # BLD: 9.81 K/UL (ref 4.23–9.07)

## 2021-06-22 PROCEDURE — 3017F COLORECTAL CA SCREEN DOC REV: CPT | Performed by: PHYSICIAN ASSISTANT

## 2021-06-22 PROCEDURE — 85025 COMPLETE CBC W/AUTO DIFF WBC: CPT

## 2021-06-22 PROCEDURE — G8419 CALC BMI OUT NRM PARAM NOF/U: HCPCS | Performed by: PHYSICIAN ASSISTANT

## 2021-06-22 PROCEDURE — 1036F TOBACCO NON-USER: CPT | Performed by: PHYSICIAN ASSISTANT

## 2021-06-22 PROCEDURE — 99211 OFF/OP EST MAY X REQ PHY/QHP: CPT

## 2021-06-22 PROCEDURE — 36415 COLL VENOUS BLD VENIPUNCTURE: CPT

## 2021-06-22 PROCEDURE — G8427 DOCREV CUR MEDS BY ELIG CLIN: HCPCS | Performed by: PHYSICIAN ASSISTANT

## 2021-06-22 PROCEDURE — 99213 OFFICE O/P EST LOW 20 MIN: CPT | Performed by: PHYSICIAN ASSISTANT

## 2021-06-22 ASSESSMENT — ENCOUNTER SYMPTOMS
SORE THROAT: 0
BACK PAIN: 1
ABDOMINAL PAIN: 0
ABDOMINAL DISTENTION: 0
CONSTIPATION: 0
VOMITING: 0
WHEEZING: 0
EYE ITCHING: 0
SHORTNESS OF BREATH: 0
VOICE CHANGE: 0
PHOTOPHOBIA: 0
NAUSEA: 0
COUGH: 0
COLOR CHANGE: 0
EYE DISCHARGE: 0
BLOOD IN STOOL: 0
DIARRHEA: 0
TROUBLE SWALLOWING: 0

## 2021-06-22 NOTE — PROGRESS NOTES
Progress Note      Pt Name: Niraj Saul  YOB: 1965  MRN: [id number]    Date of evaluation: 6/22/2021  History Obtained From:  patient, electronic medical record    CHIEF COMPLAINT:    Chief Complaint   Patient presents with    Follow-up     Polycythemia, secondary     Current active problems  Secondary polycythemia   Thrombocytopenia    HISTORY OF PRESENT ILLNESS:    Niraj Saul is a 55 y.o.  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 He does require periodic therapeutic phlebotomies. He has responded well to the therapeutic phlebotomies when needed. He required aortobifemoral bypass by Dr. Adrienne Cage on 3/8/2021. He stopped smoking at that time. He had lost quite a bit of weight during the winter months. But was able to gain it back. He denies any night sweats, extreme fatigue. He denies any weight changes, night sweats, pruritus, extreme fatigue. He has a history of seizures but has not had any recent seizure activity. He reports that his HIV status has continued to be stableundetectable. He continues on his antiretrovirals. Blood pressure is stable on his metoprolol. He continues treating his cholesterol. 1st HEMATOLOGY HISTORY: Secondary polycythemia  Hung was seen in initial hematology consultation on 10/30/2017 referred by Dr. Tami Linares for evaluation treatment of polycythemia. CBC from 9/18/2017 revealed a WBC of 11.2, Hgb 19.5, HCT 55.2, ,000. SEROLOGY 9/18/2017  ALVARO 2 - negative for V617F and exon 14 mutation  CALR - negative  MPL - negative    CMP- CRT 1.45 with GFR 55 otherwise negative    He does smoke one half pack cigarettes per day. His initial CBC in the office revealed a WBC of 12.18 normal percent differential, Hgb 18.8, HCT 57.5, .2, ,000.     Serologic evaluation has been requested looking into his polycythemia which is most likely a secondary polycythemia secondary to tobacco abuse. We'll also look into the macrocytosis and mild thrombocytopenia. Therapeutic phlebotomies were initiated 250 cc. SEROLOGY 10/30/2017  B12 - 444  Folate - 11.1  EPO - 9.9  Serum Fe - 76  TIBC - 356  Fe Sat - 21%  Ferritin - 145    He stopped smoking 3/8/2021 at the time of his aortobifemoral bypass    TREATMENT SUMMARY  1. Therapeutic phlebotomy initiated 10/30/2017    2nd HEMATOLOGY HISTORY: Thrombocytopenia  He was seen in routine follow-up in the office on 2/19/2018 and his PLT had dropped to 147,000. SEROLOGY 2/19/2018   Copper - 120  Zinc - 71  SPEP - No M spike  Antiplatelet AB - Negative  MILAD positive RNP1.6 (0-0.9) - appointment was made with Dr. Juliana Garcia however he did not keep the appointment and ultimately chose not to go. Ultrasound spleen at [facility] on 5/21/2018 reveals borderline splenomegaly measuring 12.2 x 5.7 x 4.9 cm. Hepatitis A, B, C - A and C negative; B core antigen positive - followed by Dr. Renate Carrion    Serology 6/1/2020  CMP - alk phos 155  B12 - 569  Folate - 12.4    3rd HEMATOLOGY HISTORY: Leukocytosis (lymphocytosis)    CBC on 10/5/2020 revealed WBC 15.66 with differential switching around to 50.3% lymphocytes and 39.2% neutrophils. ESR - 7  CRP - 18 (0-10)  BCR/ABL - no mutation detected ([facility])      Peripheral blood flow cytometry to [facility] 10/5/2020  · No B-cell monoclonality  · Decreased CD4: CD8 ratio (0.36: 1) -most likely due to underlying known HIV  · No T-cell aberrant antigen expression.   (No increase in T-cell LGL)  · Blasts are not increased      Past Medical History:   Diagnosis Date    Abnormal immunological finding in serum, unspecified     Adult BMI <19 kg/sq m     AIDS (acquired immune deficiency syndrome) (HCC)     Anxiety and depression     CAD (coronary artery disease)     MI    Carotid stenosis     CVA (cerebral vascular accident) ([facility] [address]     Residual right-sided weakness and expressive aphasia    MI (myocardial Thin,   HENT:      Head: Normocephalic and atraumatic. Nose: Nose normal.   Eyes:      General: No scleral icterus. Conjunctiva/sclera: Conjunctivae normal.   Neck:      Vascular: No JVD. Trachea: No tracheal deviation. Cardiovascular:      Rate and Rhythm: Normal rate and regular rhythm. Heart sounds: Normal heart sounds. No murmur heard. Pulmonary:      Effort: Pulmonary effort is normal. No respiratory distress. Breath sounds: Normal breath sounds. No wheezing. Abdominal:      General: Bowel sounds are normal. There is no distension. Palpations: Abdomen is soft. There is no hepatomegaly, splenomegaly or mass. Tenderness: There is no abdominal tenderness. Musculoskeletal:         General: Deformity (Right arm) present. No tenderness. Lymphadenopathy:      Cervical: No cervical adenopathy. Upper Body:      Right upper body: No supraclavicular or axillary adenopathy. Left upper body: No supraclavicular or axillary adenopathy. Lower Body: No right inguinal adenopathy. No left inguinal adenopathy. Skin:     Findings: No erythema or rash. Neurological:      Mental Status: He is alert and oriented to person, place, and time. Motor: Abnormal muscle tone (Right arm and leg) present. Coordination: Coordination abnormal.   Psychiatric:         Thought Content: Thought content normal.          CBC reviewed by me  Lab Results   Component Value Date    WBC 9.81 (H) 06/22/2021    HGB 16.9 06/22/2021    HCT 51.7 (HH) 06/22/2021    .6 (H) 06/22/2021     06/22/2021       VISIT DIAGNOSIS  1. Polycythemia, secondary    2. Thrombocytopenia, unspecified (HCC)    3. Leukocytosis, unspecified type    4. Cigarette nicotine dependence without complication        ASSESSMENT/PLAN:      1. Secondary polycythemia. HCT today is 51.7. He does not require a therapeutic phlebotomy.   He has stopped smoking so hopefully his hematocrit will stay within an

## 2021-12-13 NOTE — PROGRESS NOTES
Progress Note      Pt Name: Russ Bo  YOB: 1965  MRN: [id number]    Date of evaluation: 12/14/2021  History Obtained From:  patient, electronic medical record    CHIEF COMPLAINT:    Chief Complaint   Patient presents with    Follow-up     Polycythemia, secondary     Current active problems  Secondary polycythemia   Thrombocytopenia    HISTORY OF PRESENT ILLNESS:    Russ Bo is a 55 y.o.  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 He does require periodic therapeutic phlebotomies. He has responded well to the therapeutic phlebotomies when needed. He required aortobifemoral bypass by Dr. Usha Branch on 3/8/2021. He stopped smoking at that time. Unfortunately he is smoking againabout a third of a pack a day. He did take his COVID-19 vaccination as well as the booster. He also received his influenza vaccine. He denies any weight changes, night sweats, pruritus, extreme fatigue. He has a history of seizures but has not had any recent seizure activity. He reports that his HIV status has continued to be stableundetectable. He continues on his antiretrovirals. Blood pressure is stable on his metoprolol. He continues treating his cholesterol. 1st HEMATOLOGY HISTORY: Secondary polycythemia  Hung was seen in initial hematology consultation on 10/30/2017 referred by Dr. Wilian Guerra for evaluation treatment of polycythemia. CBC from 9/18/2017 revealed a WBC of 11.2, Hgb 19.5, HCT 55.2, ,000. SEROLOGY 9/18/2017  ALVARO 2 - negative for V617F and exon 14 mutation  CALR - negative  MPL - negative    CMP- CRT 1.45 with GFR 55 otherwise negative    He does smoke one half pack cigarettes per day. His initial CBC in the office revealed a WBC of 12.18 normal percent differential, Hgb 18.8, HCT 57.5, .2, ,000.     Serologic evaluation has been requested looking into his polycythemia which is most likely a secondary polycythemia secondary to tobacco abuse. We'll also look into the macrocytosis and mild thrombocytopenia. Therapeutic phlebotomies were initiated 250 cc. SEROLOGY 10/30/2017  B12 - 444  Folate - 11.1  EPO - 9.9  Serum Fe - 76  TIBC - 356  Fe Sat - 21%  Ferritin - 145    He stopped smoking 3/8/2021 at the time of his aortobifemoral bypass    TREATMENT SUMMARY  1. Therapeutic phlebotomy initiated 10/30/2017    2nd HEMATOLOGY HISTORY: Thrombocytopenia  He was seen in routine follow-up in the office on 2/19/2018 and his PLT had dropped to 147,000. SEROLOGY 2/19/2018   Copper - 120  Zinc - 71  SPEP - No M spike  Antiplatelet AB - Negative  MILAD positive RNP1.6 (0-0.9) - appointment was made with Dr. Andrea Pan however he did not keep the appointment and ultimately chose not to go. Ultrasound spleen [facility] on 5/21/2018 reveals borderline splenomegaly measuring 12.2 x 5.7 x 4.9 cm. Hepatitis A, B, C - A and C negative; B core antigen positive - followed by Dr. Fabian Smith    Serology 6/1/2020  CMP - alk phos 155  B12 - 569  Folate - 12.4    3rd HEMATOLOGY HISTORY: Leukocytosis (lymphocytosis)    CBC on 10/5/2020 revealed WBC 15.66 with differential switching around to 50.3% lymphocytes and 39.2% neutrophils. ESR - 7  CRP - 18 (0-10)  BCR/ABL - no mutation detected ([facility])      Peripheral blood flow cytometry to [facility] 10/5/2020  · No B-cell monoclonality  · Decreased CD4: CD8 ratio (0.36: 1) -most likely due to underlying known HIV  · No T-cell aberrant antigen expression.   (No increase in T-cell LGL)  · Blasts are not increased      Past Medical History:   Diagnosis Date    Abnormal immunological finding in serum, unspecified     Adult BMI <19 kg/sq m     AIDS (acquired immune deficiency syndrome) (HCC)     Anxiety and depression     CAD (coronary artery disease)     MI    Carotid stenosis     [facility]A [facility]cere[facility] vascular accident) [address] [address]     Residual right-sided weakness and expressive aphasia    MI (myocardial infarction) [facility] [address]     Nicotine dependence, unspecified, uncomplicated     Polycythemia, secondary     Positive MILAD (antinuclear antibody)     RNP, referred to rheumatology but did not keep appointment    Seizure [facility])     Thrombocytopenia, unspecified ([facility] [address]     Vitamin D deficiency        Past Surgical History:   Procedure Laterality Date    COLONOSCOPY  01/31/2017       Current Outpatient Medications:     BANOPHEN 25 MG capsule, TAKE 2 CAPSULES BY MOUTH AT BEDTIME AS NEEDED, Disp: , Rfl:     TRIUMEQ 600- MG TABS, 1 tablet by mouth daily; Take 2 hours before or 6 hours after Calcium, Antacids or Vitamins. , Disp: , Rfl: 2    HYDROcodone-acetaminophen (NORCO) 7.5-325 MG per tablet, Take 1 tablet by mouth.  Per patient take 2 times a day, Disp: , Rfl:     pregabalin (LYRICA) 150 MG capsule, Take 150 mg by mouth., Disp: , Rfl:     ASPIRIN LOW DOSE 81 MG EC tablet, TAKE 1 TABLET BY MOUTH DAILY, Disp: , Rfl: 2    cetirizine (ZYRTEC) 10 MG tablet, TAKE 1 TABLET BY MOUTH DAILY, Disp: , Rfl: 2    metoprolol tartrate (LOPRESSOR) 25 MG tablet, TAKE 1 TABLET BY MOUTH TWICE DAILY, Disp: , Rfl: 2    loperamide (IMODIUM) 2 MG capsule, TAKE 1 CAPSULE BY MOUTH THREE TIMES DAILY BEFORE MEALS, Disp: , Rfl: 2    potassium chloride (KLOR-CON M) 20 MEQ extended release tablet, Take 20 mEq by mouth, Disp: , Rfl:     levETIRAcetam (KEPPRA) 750 MG tablet, TAKE 2 TABLETS BY MOUTH TWICE DAILY, Disp: , Rfl: 2    atorvastatin (LIPITOR) 40 MG tablet, TAKE 1 TABLET BY MOUTH DAILY, Disp: , Rfl: 2    Omega-3 Fatty Acids (RA FISH OIL) 900 MG CAPS, TAKE 2 CAPSULE BY MOUTH TWICE DAILY, Disp: , Rfl:     nitroGLYCERIN (NITROSTAT) 0.4 MG SL tablet, Place 0.4 mg under the tongue, Disp: , Rfl:     ergocalciferol (DRISDOL) 71641 units capsule, Take 50,000 Units by mouth every 28 days, Disp: , Rfl:     alendronate (FOSAMAX) 70 MG tablet, Take 1 tablet by mouth weekly, Disp: , Rfl: Allergies   Allergen Reactions    Fenofibrate     Niacin And Related           Social History     Tobacco Use    Smoking status: Former Smoker     Quit date: 3/8/2021     Years since quittin.7    Smokeless tobacco: Never Used   Substance Use Topics    Alcohol use: Not on file    Drug use: Not on file       Family History   Problem Relation Age of Onset    No Known Problems Mother     No Known Problems Father        Subjective   REVIEW OF SYSTEMS:   Review of Systems   Constitutional: Positive for fatigue (Mild). Negative for chills, diaphoresis, fever and unexpected weight change. HENT: Negative for congestion, mouth sores, nosebleeds, sore throat, trouble swallowing and voice change. Eyes: Negative for photophobia, discharge and itching. Respiratory: Negative for cough, shortness of breath and wheezing. Cardiovascular: Negative for chest pain, palpitations and leg swelling. Gastrointestinal: Negative for abdominal distention, abdominal pain, blood in stool, constipation, diarrhea, nausea and vomiting. Endocrine: Negative for cold intolerance, heat intolerance, polydipsia and polyuria. Genitourinary: Negative for difficulty urinating, dysuria, hematuria and urgency. Musculoskeletal: Positive for arthralgias and back pain. Negative for joint swelling and myalgias. Skin: Negative for color change, rash and wound. Neurological: Positive for weakness (Chronic right side mild paralysis). Negative for dizziness, tremors, seizures, syncope and light-headedness. Hematological: Negative for adenopathy. Does not bruise/bleed easily. Psychiatric/Behavioral: Negative for behavioral problems and suicidal ideas. The patient is not nervous/anxious. All other systems reviewed and are negative. Objective   /60   Pulse 67   Ht 6' 6\" (1.981 m)   Wt 135 lb (61.2 kg)   SpO2 98%   BMI 15.60 kg/m²     PHYSICAL EXAM:  Physical Exam  Vitals reviewed.    Constitutional:       General: He is not in acute distress. Comments: Thin,   HENT:      Head: Normocephalic and atraumatic. Nose: Nose normal.   Eyes:      General: No scleral icterus. Conjunctiva/sclera: Conjunctivae normal.   Neck:      Vascular: No JVD. Trachea: No tracheal deviation. Cardiovascular:      Rate and Rhythm: Normal rate and regular rhythm. Heart sounds: Normal heart sounds. No murmur heard. Pulmonary:      Effort: Pulmonary effort is normal. No respiratory distress. Breath sounds: Normal breath sounds. No wheezing. Chest:   Breasts:      Right: No axillary adenopathy or supraclavicular adenopathy. Left: No axillary adenopathy or supraclavicular adenopathy. Abdominal:      General: Bowel sounds are normal. There is no distension. Palpations: Abdomen is soft. There is no hepatomegaly, splenomegaly or mass. Tenderness: There is no abdominal tenderness. Musculoskeletal:         General: Deformity (Right arm) present. No tenderness. Lymphadenopathy:      Cervical: No cervical adenopathy. Upper Body:      Right upper body: No supraclavicular or axillary adenopathy. Left upper body: No supraclavicular or axillary adenopathy. Lower Body: No right inguinal adenopathy. No left inguinal adenopathy. Skin:     Findings: No erythema or rash. Neurological:      Mental Status: He is alert and oriented to person, place, and time. Motor: Abnormal muscle tone (Right arm and leg) present. Coordination: Coordination abnormal.   Psychiatric:         Thought Content: Thought content normal.          CBC reviewed by me  Lab Results   Component Value Date    WBC 9.95 (H) 12/14/2021    HGB 18.9 (HH) 12/14/2021    HCT 58.9 (HH) 12/14/2021    .5 (H) 12/14/2021     12/14/2021       VISIT DIAGNOSIS  1. Polycythemia, secondary    2. Thrombocytopenia, unspecified (HCC)    3. Lymphocytosis    4. Cigarette nicotine dependence without complication    5. Encounter for screening for malignant neoplasm of prostate        ASSESSMENT/PLAN:      1. Secondary polycythemia. Hematocrit today is 58.9. He will have a 500 cc therapeutic phlebotomy. 2. Thrombocytopenia. 3.  Lymphocytosis        Physical examination does not reveal any peripheral lymphadenopathy, splenomegaly. Wt Readings from Last 3 Encounters:   12/14/21 135 lb (61.2 kg)   06/22/21 127 lb 1.6 oz (57.7 kg)   02/01/21 126 lb 4.8 oz (57.3 kg)     His weight has continued to improve he is up to 135 pounds today. 4.  Nicotine dependence. 2 view chest x-ray 3/5/2021 at [facility] revealed chronic changes with associated hyperinflation. No evidence of acute cardiopulmonary process. Unfortunately he is smoking again. I previously discussed screening CT imaging with him since he is 55years old. He does not want to have the CT performed at this time. I will continue discussed with him at follow-up. · Colon cancer screening. Colonoscopy is up-to-date performed in 2017 with 10-year follow-up planned. · Prostate cancer screening. PSA 6/1/2020 was normal at 1.0. Screening PSA will be requested today. Immunization History   Administered Date(s) Administered    COVID-19, Moderna, Booster, PF, 50mcg/0.25ml 11/16/2021    COVID-19, Moderna, Primary or Immunocompromised, PF, 100mcg/0.5mL 04/26/2021, 05/24/2021    Influenza, High Dose (Fluzone 65 yrs and older) 10/20/2021       Orders Placed This Encounter   Procedures    PSA screening   · Therapeutic phlebotomy 500 cc today      Return in about 2 months (around 2/14/2022) for With [place] and therapeutic phlebotomy.      Tonia Mallory PA-C  11:30 AM  12/14/2021

## 2021-12-14 ENCOUNTER — OFFICE VISIT (OUTPATIENT)
Dept: HEMATOLOGY | Age: 56
End: 2021-12-14
Payer: MEDICARE

## 2021-12-14 ENCOUNTER — HOSPITAL ENCOUNTER (OUTPATIENT)
Dept: INFUSION THERAPY | Age: 56
Discharge: HOME | End: 2021-12-14
Payer: MEDICARE

## 2021-12-14 VITALS
OXYGEN SATURATION: 98 % | WEIGHT: 135 LBS | SYSTOLIC BLOOD PRESSURE: 125 MMHG | BODY MASS INDEX: 15.62 KG/M2 | HEART RATE: 67 BPM | DIASTOLIC BLOOD PRESSURE: 60 MMHG | HEIGHT: 78 IN

## 2021-12-14 DIAGNOSIS — D75.1 POLYCYTHEMIA, SECONDARY: ICD-10-CM

## 2021-12-14 DIAGNOSIS — D69.6 THROMBOCYTOPENIA, UNSPECIFIED (HCC): ICD-10-CM

## 2021-12-14 DIAGNOSIS — Z12.5 ENCOUNTER FOR SCREENING FOR MALIGNANT NEOPLASM OF PROSTATE: ICD-10-CM

## 2021-12-14 DIAGNOSIS — D75.1 POLYCYTHEMIA, SECONDARY: Primary | ICD-10-CM

## 2021-12-14 DIAGNOSIS — F17.210 CIGARETTE NICOTINE DEPENDENCE WITHOUT COMPLICATION: ICD-10-CM

## 2021-12-14 DIAGNOSIS — D72.820 LYMPHOCYTOSIS: ICD-10-CM

## 2021-12-14 LAB
BASOPHILS ABSOLUTE: 0.03 K/UL (ref 0.01–0.08)
BASOPHILS RELATIVE PERCENT: 0.3 % (ref 0.1–1.2)
EOSINOPHILS ABSOLUTE: 0.28 K/UL (ref 0.04–0.54)
EOSINOPHILS RELATIVE PERCENT: 2.8 % (ref 0.7–7)
HCT VFR BLD CALC: 58.9 % (ref 40.1–51)
HEMOGLOBIN: 18.9 G/DL (ref 13.7–17.5)
LYMPHOCYTES ABSOLUTE: 3.86 K/UL (ref 1.18–3.74)
LYMPHOCYTES RELATIVE PERCENT: 38.8 % (ref 19.3–53.1)
MCH RBC QN AUTO: 32.3 PG (ref 25.7–32.2)
MCHC RBC AUTO-ENTMCNC: 32.1 G/DL (ref 32.3–36.5)
MCV RBC AUTO: 100.5 FL (ref 79–92.2)
MONOCYTES ABSOLUTE: 0.85 K/UL (ref 0.24–0.82)
MONOCYTES RELATIVE PERCENT: 8.5 % (ref 4.7–12.5)
NEUTROPHILS ABSOLUTE: 4.93 K/UL (ref 1.56–6.13)
NEUTROPHILS RELATIVE PERCENT: 49.6 % (ref 34–71.1)
PDW BLD-RTO: 14.7 % (ref 11.6–14.4)
PLATELET # BLD: 183 K/UL (ref 163–337)
PMV BLD AUTO: 11.1 FL (ref 7.4–10.4)
RBC # BLD: 5.86 M/UL (ref 4.63–6.08)
WBC # BLD: 9.95 K/UL (ref 4.23–9.07)

## 2021-12-14 PROCEDURE — 99213 OFFICE O/P EST LOW 20 MIN: CPT | Performed by: PHYSICIAN ASSISTANT

## 2021-12-14 PROCEDURE — 1036F TOBACCO NON-USER: CPT | Performed by: PHYSICIAN ASSISTANT

## 2021-12-14 PROCEDURE — G8482 FLU IMMUNIZE ORDER/ADMIN: HCPCS | Performed by: PHYSICIAN ASSISTANT

## 2021-12-14 PROCEDURE — 3017F COLORECTAL CA SCREEN DOC REV: CPT | Performed by: PHYSICIAN ASSISTANT

## 2021-12-14 PROCEDURE — 99212 OFFICE O/P EST SF 10 MIN: CPT

## 2021-12-14 PROCEDURE — 99195 PHLEBOTOMY: CPT

## 2021-12-14 PROCEDURE — G8419 CALC BMI OUT NRM PARAM NOF/U: HCPCS | Performed by: PHYSICIAN ASSISTANT

## 2021-12-14 PROCEDURE — 85025 COMPLETE CBC W/AUTO DIFF WBC: CPT

## 2021-12-14 PROCEDURE — G8428 CUR MEDS NOT DOCUMENT: HCPCS | Performed by: PHYSICIAN ASSISTANT

## 2021-12-14 PROCEDURE — 36415 COLL VENOUS BLD VENIPUNCTURE: CPT

## 2021-12-14 RX ORDER — 0.9 % SODIUM CHLORIDE 0.9 %
500 INTRAVENOUS SOLUTION INTRAVENOUS ONCE
OUTPATIENT
Start: 2021-12-14 | End: 2021-12-14

## 2021-12-14 RX ORDER — ALENDRONATE SODIUM 70 MG/1
TABLET ORAL
COMMUNITY
Start: 2021-11-18

## 2021-12-14 RX ORDER — 0.9 % SODIUM CHLORIDE 0.9 %
250 INTRAVENOUS SOLUTION INTRAVENOUS ONCE
OUTPATIENT
Start: 2021-12-14 | End: 2021-12-14

## 2021-12-14 ASSESSMENT — ENCOUNTER SYMPTOMS
VOICE CHANGE: 0
TROUBLE SWALLOWING: 0
BACK PAIN: 1
WHEEZING: 0
PHOTOPHOBIA: 0
SHORTNESS OF BREATH: 0
DIARRHEA: 0
NAUSEA: 0
COUGH: 0
CONSTIPATION: 0
COLOR CHANGE: 0
ABDOMINAL PAIN: 0
BLOOD IN STOOL: 0
VOMITING: 0
EYE ITCHING: 0
SORE THROAT: 0
EYE DISCHARGE: 0
ABDOMINAL DISTENTION: 0

## 2021-12-14 NOTE — PROGRESS NOTES
THERAPEUTIC PHLEBOTOMY    Most recent Hemoglobin 18.9Gm/dl and Hematocrit 58.9%    Date obtained: 12/14/2021       Pre-phlebotomy Vital Signs: Refer to Doc flow sheet    Start time: 1140    Tourniquet placed on patient's arm and arm palpated for venous access. Area of venous access cleansed with alcohol. Sheath removed from the needle and needle inserted into the left antecubital site. Blood flowing well down the tubing into collection bag. Adjustment/no adjustment of needle needed to maintain adequate blood flow. Scale monitoring amount of blood in bag. Stop Time: 1155  Needle removed from patient's arm. Pressure applied to patient's arm until bleeding stopped. Dry sterile dressing applied over puncture site and secured with Coban/self-adherent wrap. Final amount of blood removed: 500      Patient tolerated procedure well. No redness, edema, or signs of active bleeding noted. Discharge Instructions provided: No heavy pushing or lifting for the next 24 hours. Keep dressing dry and in place for 4 to 6 hours. If a fever GREATER than 100.5 develops, contact office. Patient discharged ambulatory with belongings.

## 2022-02-11 NOTE — PROGRESS NOTES
Progress Note      Pt Name: Ana Rhodes  YOB: 1965  MRN: [id number]    Date of evaluation: 2/15/2022  History Obtained From:  patient, electronic medical record    CHIEF COMPLAINT:    No chief complaint on file. Current active problems  Secondary polycythemia   Thrombocytopenia    HISTORY OF PRESENT ILLNESS:    Ana Rhodes is a 65 y.o.  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 He does require periodic therapeutic phlebotomies. He has responded well to the therapeutic phlebotomies when needed. He required aortobifemoral bypass by Dr. Hines  on 3/8/2021. He stopped smoking at that time. Unfortunately he is smoking againabout a third of a pack a day. He reports that he has completely stop smoking againfor the past month. He denies any headache, chest pain. He did take his COVID-19 vaccination as well as the booster. He also received his influenza vaccine. He denies any weight changes, night sweats, pruritus, extreme fatigue. He has a history of seizures but has not had any recent seizure activity. He reports that his HIV status has continued to be stableundetectable. He continues on his antiretrovirals. Blood pressure is stable on his metoprolol. He continues treating his cholesterol. 1st HEMATOLOGY HISTORY: Secondary polycythemia  Hung was seen in initial hematology consultation on 10/30/2017 referred by Dr. Seven Garcia for evaluation treatment of polycythemia. CBC from 9/18/2017 revealed a WBC of 11.2, Hgb 19.5, HCT 55.2, ,000. SEROLOGY 9/18/2017  ALVARO 2 - negative for V617F and exon 14 mutation  CALR - negative  MPL - negative    CMP- CRT 1.45 with GFR 55 otherwise negative    He does smoke one half pack cigarettes per day. His initial CBC in the office revealed a WBC of 12.18 normal percent differential, Hgb 18.8, HCT 57.5, .2, ,000.     Serologic evaluation has been requested looking into his polycythemia which is most likely a secondary polycythemia secondary to tobacco abuse. We'll also look into the macrocytosis and mild thrombocytopenia. Therapeutic phlebotomies were initiated 250 cc. SEROLOGY 10/30/2017  B12 - 444  Folate - 11.1  EPO - 9.9  Serum Fe - 76  TIBC - 356  Fe Sat - 21%  Ferritin - 145    He stopped smoking 3/8/2021 at the time of his aortobifemoral bypass    TREATMENT SUMMARY  1. Therapeutic phlebotomy initiated 10/30/2017    2nd HEMATOLOGY HISTORY: Thrombocytopenia  He was seen in routine follow-up in the office on 2/19/2018 and his PLT had dropped to 147,000. SEROLOGY 2/19/2018   Copper - 120  Zinc - 71  SPEP - No M spike  Antiplatelet AB - Negative  MILAD positive RNP1.6 (0-0.9) - appointment was made with Dr. Jaron Daniel however he did not keep the appointment and ultimately chose not to go. Ultrasound spleen at [facility] on 5/21/2018 reveals borderline splenomegaly measuring 12.2 x 5.7 x 4.9 cm. Hepatitis A, B, C - A and C negative; B core antigen positive - followed by Dr. Jose Luis Soto    Serology 6/1/2020  CMP - alk phos 155  B12 - 569  Folate - 12.4    3rd HEMATOLOGY HISTORY: Leukocytosis (lymphocytosis)    CBC on 10/5/2020 revealed WBC 15.66 with differential switching around to 50.3% lymphocytes and 39.2% neutrophils. ESR - 7  CRP - 18 (0-10)  BCR/ABL - no mutation detected ([facility])      Peripheral blood flow cytometry to [facility] 10/5/2020  · No B-cell monoclonality  · Decreased CD4: CD8 ratio (0.36: 1) -most likely due to underlying known HIV  · No T-cell aberrant antigen expression.   (No increase in T-cell LGL)  · Blasts are not increased      Past Medical History:   Diagnosis Date    Abnormal immunological finding in serum, unspecified     Adult BMI <19 kg/sq m     AIDS (acquired immune deficiency syndrome) (HCC)     Anxiety and depression     CAD (coronary artery disease)     MI    Carotid stenosis     [facility])     Residual right-sided weakness and expressive aphasia    MI (myocardial infarction) ([facility] [address]     Nicotine dependence, unspecified, uncomplicated     Polycythemia, secondary     Positive MILAD (antinuclear antibody)     RNP, referred to rheumatology but did not keep appointment    Seizure [facility])     Thrombocytopenia, unspecified ([facility] [address]     Vitamin D deficiency        Past Surgical History:   Procedure Laterality Date    COLONOSCOPY  01/31/2017       Current Outpatient Medications:     alendronate (FOSAMAX) 70 MG tablet, Take 1 tablet by mouth weekly, Disp: , Rfl:     BANOPHEN 25 MG capsule, TAKE 2 CAPSULES BY MOUTH AT BEDTIME AS NEEDED, Disp: , Rfl:     TRIUMEQ 600- MG TABS, 1 tablet by mouth daily; Take 2 hours before or 6 hours after Calcium, Antacids or Vitamins. , Disp: , Rfl: 2    HYDROcodone-acetaminophen (NORCO) 7.5-325 MG per tablet, Take 1 tablet by mouth.  Per patient take 2 times a day, Disp: , Rfl:     pregabalin (LYRICA) 150 MG capsule, Take 150 mg by mouth., Disp: , Rfl:     ASPIRIN LOW DOSE 81 MG EC tablet, TAKE 1 TABLET BY MOUTH DAILY, Disp: , Rfl: 2    cetirizine (ZYRTEC) 10 MG tablet, TAKE 1 TABLET BY MOUTH DAILY, Disp: , Rfl: 2    metoprolol tartrate (LOPRESSOR) 25 MG tablet, TAKE 1 TABLET BY MOUTH TWICE DAILY, Disp: , Rfl: 2    loperamide (IMODIUM) 2 MG capsule, TAKE 1 CAPSULE BY MOUTH THREE TIMES DAILY BEFORE MEALS, Disp: , Rfl: 2    potassium chloride (KLOR-CON M) 20 MEQ extended release tablet, Take 20 mEq by mouth, Disp: , Rfl:     levETIRAcetam (KEPPRA) 750 MG tablet, TAKE 2 TABLETS BY MOUTH TWICE DAILY, Disp: , Rfl: 2    atorvastatin (LIPITOR) 40 MG tablet, TAKE 1 TABLET BY MOUTH DAILY, Disp: , Rfl: 2    Omega-3 Fatty Acids (RA FISH OIL) 900 MG CAPS, TAKE 2 CAPSULE BY MOUTH TWICE DAILY, Disp: , Rfl:     nitroGLYCERIN (NITROSTAT) 0.4 MG SL tablet, Place 0.4 mg under the tongue, Disp: , Rfl:     ergocalciferol (DRISDOL) 76769 units capsule, Take 50,000 Units by mouth every 28 days, Disp: , Rfl:         Allergies   Allergen Reactions    Fenofibrate     Niacin And Related           Social History     Tobacco Use    Smoking status: Former Smoker     Quit date: 3/8/2021     Years since quittin.9    Smokeless tobacco: Never Used   Substance Use Topics    Alcohol use: Not on file    Drug use: Not on file       Family History   Problem Relation Age of Onset    No Known Problems Mother     No Known Problems Father        Subjective   REVIEW OF SYSTEMS:   Review of Systems   Constitutional: Positive for fatigue (Mild). Negative for chills, diaphoresis, fever and unexpected weight change. HENT: Negative for congestion, mouth sores, nosebleeds, sore throat, trouble swallowing and voice change. Eyes: Negative for photophobia, discharge and itching. Respiratory: Negative for cough, shortness of breath and wheezing. Cardiovascular: Negative for chest pain, palpitations and leg swelling. Gastrointestinal: Negative for abdominal distention, abdominal pain, blood in stool, constipation, diarrhea, nausea and vomiting. Endocrine: Negative for cold intolerance, heat intolerance, polydipsia and polyuria. Genitourinary: Negative for difficulty urinating, dysuria, hematuria and urgency. Musculoskeletal: Positive for arthralgias and back pain. Negative for joint swelling and myalgias. Skin: Negative for color change, rash and wound. Neurological: Positive for weakness (Chronic right side mild paralysis). Negative for dizziness, tremors, seizures, syncope and light-headedness. Hematological: Negative for adenopathy. Does not bruise/bleed easily. Psychiatric/Behavioral: Negative for behavioral problems and suicidal ideas. The patient is not nervous/anxious. All other systems reviewed and are negative.       Objective   BP (!) 130/90   Pulse 71   Ht 6' 6\" (1.981 m)   Wt 134 lb 9.6 oz (61.1 kg)   SpO2 99%   BMI 15.55 kg/m²     PHYSICAL EXAM:  Physical Exam  Vitals reviewed. Constitutional:       General: He is not in acute distress. Comments: Thin,   HENT:      Head: Normocephalic and atraumatic. Nose: Nose normal.   Eyes:      General: No scleral icterus. Conjunctiva/sclera: Conjunctivae normal.   Neck:      Vascular: No JVD. Trachea: No tracheal deviation. Cardiovascular:      Rate and Rhythm: Normal rate and regular rhythm. Heart sounds: Normal heart sounds. No murmur heard. Pulmonary:      Effort: Pulmonary effort is normal. No respiratory distress. Breath sounds: Normal breath sounds. No wheezing. Chest:   Breasts:      Right: No axillary adenopathy or supraclavicular adenopathy. Left: No axillary adenopathy or supraclavicular adenopathy. Abdominal:      General: Bowel sounds are normal. There is no distension. Palpations: Abdomen is soft. There is no hepatomegaly, splenomegaly or mass. Tenderness: There is no abdominal tenderness. Musculoskeletal:         General: Deformity (Right arm) present. No tenderness. Lymphadenopathy:      Cervical: No cervical adenopathy. Upper Body:      Right upper body: No supraclavicular or axillary adenopathy. Left upper body: No supraclavicular or axillary adenopathy. Lower Body: No right inguinal adenopathy. No left inguinal adenopathy. Skin:     Findings: No erythema or rash. Neurological:      Mental Status: He is alert and oriented to person, place, and time. Motor: Abnormal muscle tone (Right arm and leg) present. Coordination: Coordination abnormal.   Psychiatric:         Thought Content: Thought content normal.          CBC reviewed by me  Lab Results   Component Value Date    WBC 9.70 (H) 02/15/2022    HGB 17.7 (H) 02/15/2022    HCT 51.8 (HH) 02/15/2022    .4 (H) 02/15/2022     02/15/2022       VISIT DIAGNOSIS  1. Polycythemia, secondary    2. Thrombocytopenia, unspecified ([address]    3. Lymphocytosis    4. Cigarette nicotine dependence without complication        ASSESSMENT/PLAN:      1. Secondary polycythemia. His hematocrit today is only 51.8. He will not require a therapeutic phlebotomy. 2. Thrombocytopenia. PLT today is stable at 166,000. 3.  Lymphocytosis        WBC today is only 9.7 with 52.1% lymphocyteonly 38.5% lymphocyte. Physical examination does not reveal any peripheral lymphadenopathy, splenomegaly. Wt Readings from Last 3 Encounters:   02/15/22 134 lb 9.6 oz (61.1 kg)   12/14/21 135 lb (61.2 kg)   06/22/21 127 lb 1.6 oz (57.7 kg)     His weight has stabilized at 134 pounds    4. Nicotine dependence. 2 view chest x-ray 3/5/2021 at [facility] revealed chronic changes with associated hyperinflation. No evidence of acute cardiopulmonary process. He has stopped smoking again. I again discussed screening low-dose CT imaging and he continues to be hesitant to have that performed. I will continue to discuss this with him at follow-up. HEALTH MAINTENANCE    · Colon cancer screening. Colonoscopy is up-to-date performed in 2017 with 10-year follow-up planned. · Prostate cancer screening. PSA 1.7 on 12/13/2021. Immunization History   Administered Date(s) Administered    COVID-19, Moderna, Booster, PF, 50mcg/0.25ml 11/16/2021    COVID-19, [place], Primary or Immunocompromised, PF, 100mcg/0.5mL 04/26/2021, 05/24/2021    Influenza, High Dose (Fluzone 65 yrs and older) 10/20/2021           Return in about 3 months (around 5/15/2022) for With [place] and possible therapeutic phlebotomy.      Brianna Elizondo PA-C  10:32 AM  2/15/2022

## 2022-02-14 DIAGNOSIS — D75.1 POLYCYTHEMIA, SECONDARY: Primary | ICD-10-CM

## 2022-02-15 ENCOUNTER — OFFICE VISIT (OUTPATIENT)
Dept: HEMATOLOGY | Age: 57
End: 2022-02-15
Payer: MEDICARE

## 2022-02-15 ENCOUNTER — HOSPITAL ENCOUNTER (OUTPATIENT)
Dept: INFUSION THERAPY | Age: 57
Discharge: HOME | End: 2022-02-15
Payer: MEDICARE

## 2022-02-15 VITALS
HEIGHT: 78 IN | SYSTOLIC BLOOD PRESSURE: 130 MMHG | DIASTOLIC BLOOD PRESSURE: 90 MMHG | WEIGHT: 134.6 LBS | HEART RATE: 71 BPM | OXYGEN SATURATION: 99 % | BODY MASS INDEX: 15.57 KG/M2

## 2022-02-15 DIAGNOSIS — D69.6 THROMBOCYTOPENIA, UNSPECIFIED (HCC): ICD-10-CM

## 2022-02-15 DIAGNOSIS — D72.820 LYMPHOCYTOSIS: ICD-10-CM

## 2022-02-15 DIAGNOSIS — F17.210 CIGARETTE NICOTINE DEPENDENCE WITHOUT COMPLICATION: ICD-10-CM

## 2022-02-15 DIAGNOSIS — D75.1 POLYCYTHEMIA, SECONDARY: Primary | ICD-10-CM

## 2022-02-15 DIAGNOSIS — D75.1 POLYCYTHEMIA, SECONDARY: ICD-10-CM

## 2022-02-15 LAB
HCT VFR BLD CALC: 51.8 % (ref 40.1–51)
HEMOGLOBIN: 17.7 G/DL (ref 13.7–17.5)
MCH RBC QN AUTO: 34.3 PG (ref 25.7–32.2)
MCHC RBC AUTO-ENTMCNC: 34.2 G/DL (ref 32.3–36.5)
MCV RBC AUTO: 100.4 FL (ref 79–92.2)
PDW BLD-RTO: 13 % (ref 11.6–14.4)
PLATELET # BLD: 166 K/UL (ref 163–337)
PMV BLD AUTO: 11.5 FL (ref 7.4–10.4)
RBC # BLD: 5.16 M/UL (ref 4.63–6.08)
WBC # BLD: 9.7 K/UL (ref 4.23–9.07)

## 2022-02-15 PROCEDURE — G8428 CUR MEDS NOT DOCUMENT: HCPCS | Performed by: PHYSICIAN ASSISTANT

## 2022-02-15 PROCEDURE — G8419 CALC BMI OUT NRM PARAM NOF/U: HCPCS | Performed by: PHYSICIAN ASSISTANT

## 2022-02-15 PROCEDURE — 99213 OFFICE O/P EST LOW 20 MIN: CPT | Performed by: PHYSICIAN ASSISTANT

## 2022-02-15 PROCEDURE — 85027 COMPLETE CBC AUTOMATED: CPT

## 2022-02-15 PROCEDURE — 1036F TOBACCO NON-USER: CPT | Performed by: PHYSICIAN ASSISTANT

## 2022-02-15 PROCEDURE — G8482 FLU IMMUNIZE ORDER/ADMIN: HCPCS | Performed by: PHYSICIAN ASSISTANT

## 2022-02-15 PROCEDURE — 99211 OFF/OP EST MAY X REQ PHY/QHP: CPT

## 2022-02-15 PROCEDURE — 3017F COLORECTAL CA SCREEN DOC REV: CPT | Performed by: PHYSICIAN ASSISTANT

## 2022-02-15 ASSESSMENT — ENCOUNTER SYMPTOMS
EYE ITCHING: 0
TROUBLE SWALLOWING: 0
NAUSEA: 0
DIARRHEA: 0
ABDOMINAL PAIN: 0
COUGH: 0
VOMITING: 0
CONSTIPATION: 0
SHORTNESS OF BREATH: 0
BLOOD IN STOOL: 0
BACK PAIN: 1
COLOR CHANGE: 0
WHEEZING: 0
SORE THROAT: 0
PHOTOPHOBIA: 0
VOICE CHANGE: 0
ABDOMINAL DISTENTION: 0
EYE DISCHARGE: 0

## 2022-04-01 NOTE — ADDENDUM NOTE
Encounter addended by: Marylou Ivy MA on: 4/1/2022 11:00 AM   Actions taken: Charge Capture section accepted

## 2022-05-13 NOTE — PROGRESS NOTES
Progress Note      Pt Name: Giles Triplett  YOB: 1965  MRN: [id number]    Date of evaluation: 5/16/2022  History Obtained From:  patient, electronic medical record    CHIEF COMPLAINT:    Chief Complaint   Patient presents with    Follow-up     Polycythemia, secondary     Current active problems  Secondary polycythemia   Thrombocytopenia    HISTORY OF PRESENT ILLNESS:    Giles Triplett is a 65 y.o.  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 He does require periodic therapeutic phlebotomies. He has responded well to the therapeutic phlebotomies when needed. He has history of peripheral vascular disease requiring aortofemoral bypass by Dr. Silvestre Reilly previously. He stopped smoking around the time of surgery but started back. He has smoking about a third of a pack cigarettes at present. He denies any chest pain. He denies any weight changes, night sweats pruritus, extreme fatigue. He has a history of seizures but has not had any recent seizure activity. He reports that his HIV status has continued to be stableundetectable. He continues on his antiretrovirals. Blood pressure is stable on his metoprolol. He continues treating his cholesterol. 1st HEMATOLOGY HISTORY: Secondary polycythemia  Hung was seen in initial hematology consultation on 10/30/2017 referred by Dr. Tra Day for evaluation treatment of polycythemia. CBC from 9/18/2017 revealed a WBC of 11.2, Hgb 19.5, HCT 55.2, ,000. SEROLOGY 9/18/2017  ALVARO 2 - negative for V617F and exon 14 mutation  CALR - negative  MPL - negative    CMP- CRT 1.45 with GFR 55 otherwise negative    He does smoke one half pack cigarettes per day. His initial CBC in the office revealed a WBC of 12.18 normal percent differential, Hgb 18.8, HCT 57.5, .2, ,000.     Serologic evaluation has been requested looking into his polycythemia which is most likely a secondary polycythemia secondary to tobacco abuse. We'll also look into the macrocytosis and mild thrombocytopenia. Therapeutic phlebotomies were initiated 250 cc. SEROLOGY 10/30/2017  B12 - 444  Folate - 11.1  EPO - 9.9  Serum Fe - 76  TIBC - 356  Fe Sat - 21%  Ferritin - 145    He stopped smoking 3/8/2021 at the time of his aortobifemoral bypass    TREATMENT SUMMARY  1. Therapeutic phlebotomy initiated 10/30/2017    2nd HEMATOLOGY HISTORY: Thrombocytopenia  He was seen in routine follow-up in the office on 2/19/2018 and his PLT had dropped to 147,000. SEROLOGY 2/19/2018   Copper - 120  Zinc - 71  SPEP - No M spike  Antiplatelet AB - Negative  MILAD positive RNP1.6 (0-0.9) - appointment was made with Dr. Malorie Mckoy however he did not keep the appointment and ultimately chose not to go. Ultrasound spleen at [facility] on 5/21/2018 reveals borderline splenomegaly measuring 12.2 x 5.7 x 4.9 cm. Hepatitis A, B, C - A and C negative; B core antigen positive - followed by Dr. Clif Samuel    Serology 6/1/2020  CMP - alk phos 155  B12 - 569  Folate - 12.4    3rd HEMATOLOGY HISTORY: Leukocytosis (lymphocytosis)    CBC on 10/5/2020 revealed WBC 15.66 with differential switching around to 50.3% lymphocytes and 39.2% neutrophils. ESR - 7  CRP - 18 (0-10)  BCR/ABL - no mutation detected ([facility])      Peripheral blood flow cytometry to [facility] 10/5/2020  · No B-cell monoclonality  · Decreased CD4: CD8 ratio (0.36: 1) -most likely due to underlying known HIV  · No T-cell aberrant antigen expression.   (No increase in T-cell LGL)  · Blasts are not increased      Past Medical History:   Diagnosis Date    Abnormal immunological finding in serum, unspecified     Adult BMI <19 kg/sq m     AIDS (acquired immune deficiency syndrome) (HCC)     Anxiety and depression     CAD (coronary artery disease)     MI    Carotid stenosis     [facility]A [facility]cerebral [facility] accident) ([facility] [address]     Residual right-sided weakness and expressive aphasia    MI (myocardial infarction) [facility] [address]     Nicotine dependence, unspecified, uncomplicated     Polycythemia, secondary     Positive MILAD (antinuclear antibody)     RNP, referred to rheumatology but did not keep appointment    Seizure [facility])     Thrombocytopenia, unspecified ([facility] [address]     Vitamin D deficiency        Past Surgical History:   Procedure Laterality Date    COLONOSCOPY  01/31/2017       Current Outpatient Medications:     alendronate (FOSAMAX) 70 MG tablet, Take 1 tablet by mouth weekly, Disp: , Rfl:     BANOPHEN 25 MG capsule, TAKE 2 CAPSULES BY MOUTH AT BEDTIME AS NEEDED, Disp: , Rfl:     TRIUMEQ 600- MG TABS, 1 tablet by mouth daily; Take 2 hours before or 6 hours after Calcium, Antacids or Vitamins. , Disp: , Rfl: 2    HYDROcodone-acetaminophen (NORCO) 7.5-325 MG per tablet, Take 1 tablet by mouth.  Per patient take 2 times a day, Disp: , Rfl:     pregabalin (LYRICA) 150 MG capsule, Take 150 mg by mouth., Disp: , Rfl:     ASPIRIN LOW DOSE 81 MG EC tablet, TAKE 1 TABLET BY MOUTH DAILY, Disp: , Rfl: 2    cetirizine (ZYRTEC) 10 MG tablet, TAKE 1 TABLET BY MOUTH DAILY, Disp: , Rfl: 2    metoprolol tartrate (LOPRESSOR) 25 MG tablet, TAKE 1 TABLET BY MOUTH TWICE DAILY, Disp: , Rfl: 2    potassium chloride (KLOR-CON M) 20 MEQ extended release tablet, Take 20 mEq by mouth, Disp: , Rfl:     levETIRAcetam (KEPPRA) 750 MG tablet, TAKE 2 TABLETS BY MOUTH TWICE DAILY, Disp: , Rfl: 2    atorvastatin (LIPITOR) 40 MG tablet, TAKE 1 TABLET BY MOUTH DAILY, Disp: , Rfl: 2    Omega-3 Fatty Acids (RA FISH OIL) 900 MG CAPS, TAKE 2 CAPSULE BY MOUTH TWICE DAILY, Disp: , Rfl:     nitroGLYCERIN (NITROSTAT) 0.4 MG SL tablet, Place 0.4 mg under the tongue, Disp: , Rfl:     ergocalciferol (DRISDOL) 31937 units capsule, Take 50,000 Units by mouth every 28 days, Disp: , Rfl:     loperamide (IMODIUM) 2 MG capsule, TAKE 1 CAPSULE BY MOUTH THREE TIMES DAILY BEFORE MEALS (Patient not taking: Reported on 2022), Disp: , Rfl: 2        Allergies   Allergen Reactions    Fenofibrate     Niacin And Related           Social History     Tobacco Use    Smoking status: Former Smoker     Quit date: 3/8/2021     Years since quittin.1    Smokeless tobacco: Never Used   Substance Use Topics    Alcohol use: Not on file    Drug use: Not on file       Family History   Problem Relation Age of Onset    No Known Problems Mother     No Known Problems Father        Subjective   REVIEW OF SYSTEMS:   Review of Systems   Constitutional: Positive for fatigue (Mild). Negative for chills, diaphoresis, fever and unexpected weight change. HENT: Negative for congestion, mouth sores, nosebleeds, sore throat, trouble swallowing and voice change. Eyes: Negative for photophobia, discharge and itching. Respiratory: Negative for cough, shortness of breath and wheezing. Cardiovascular: Negative for chest pain, palpitations and leg swelling. Gastrointestinal: Negative for abdominal distention, abdominal pain, blood in stool, constipation, diarrhea, nausea and vomiting. Endocrine: Negative for cold intolerance, heat intolerance, polydipsia and polyuria. Genitourinary: Negative for difficulty urinating, dysuria, hematuria and urgency. Musculoskeletal: Positive for arthralgias and back pain. Negative for joint swelling and myalgias. Skin: Negative for color change, rash and wound. Neurological: Positive for weakness (Chronic right side mild paralysis). Negative for dizziness, tremors, seizures, syncope and light-headedness. Hematological: Negative for adenopathy. Does not bruise/bleed easily. Psychiatric/Behavioral: Negative for behavioral problems and suicidal ideas. The patient is not nervous/anxious. All other systems reviewed and are negative. Objective   BP (!) 148/72   Pulse 76   Wt 131 lb (59.4 kg)   SpO2 97%   BMI 15.14 kg/m²     PHYSICAL EXAM:  Physical Exam  Vitals reviewed.    Constitutional: General: He is not in acute distress. Comments: Thin,   HENT:      Head: Normocephalic and atraumatic. Nose: Nose normal.   Eyes:      General: No scleral icterus. Conjunctiva/sclera: Conjunctivae normal.   Neck:      Vascular: No JVD. Trachea: No tracheal deviation. Cardiovascular:      Rate and Rhythm: Normal rate and regular rhythm. Heart sounds: Normal heart sounds. No murmur heard. Pulmonary:      Effort: Pulmonary effort is normal. No respiratory distress. Breath sounds: Normal breath sounds. No wheezing. Chest:   Breasts:      Right: No axillary adenopathy or supraclavicular adenopathy. Left: No axillary adenopathy or supraclavicular adenopathy. Abdominal:      General: Bowel sounds are normal. There is no distension. Palpations: Abdomen is soft. There is no hepatomegaly, splenomegaly or mass. Tenderness: There is no abdominal tenderness. Musculoskeletal:         General: Deformity (Right arm, wearing brace right leg) present. No tenderness. Lymphadenopathy:      Cervical: No cervical adenopathy. Upper Body:      Right upper body: No supraclavicular or axillary adenopathy. Left upper body: No supraclavicular or axillary adenopathy. Lower Body: No right inguinal adenopathy. No left inguinal adenopathy. Skin:     Findings: No erythema or rash. Neurological:      Mental Status: He is alert and oriented to person, place, and time. Motor: Abnormal muscle tone (Right arm and leg) present. Coordination: Coordination abnormal.   Psychiatric:         Thought Content: Thought content normal.          CBC reviewed by me  Lab Results   Component Value Date    WBC 8.07 05/16/2022    HGB 17.3 05/16/2022    HCT 51.8 (HH) 05/16/2022    .4 (H) 05/16/2022     05/16/2022       VISIT DIAGNOSIS  1. Polycythemia, secondary    2. Thrombocytopenia, unspecified (HCC)    3. Lymphocytosis    4. Macrocytosis    5.  Cigarette nicotine dependence without complication        ASSESSMENT/PLAN:      1. Secondary polycythemia. HCT today is still only 51.8. He does not require therapeutic phlebotomy. 2. Thrombocytopenia. 3.  Lymphocytosis        WBC today is 8.07 with 47.7% neutrophil, 42.0% lymphocyte. Physical examination does not reveal any peripheral lymphadenopathy, splenomegaly. Wt Readings from Last 3 Encounters:   05/16/22 131 lb (59.4 kg)   02/15/22 134 lb 9.6 oz (61.1 kg)   12/14/21 135 lb (61.2 kg)     His weight is stable overall. 4.  Nicotine dependence. 2 view chest x-ray 3/5/2021 at [facility] revealed chronic changes with associated hyperinflation. No evidence of acute cardiopulmonary process. He is back to smoking a third a pack per day. I again discussed screening CT imaging with him. I discussed the risk, benefits of CT imaging. He again wants to think about it but may allow me to order this at the next visit. 5.  Macrocytosis        Macrocytosis is increased slightly. I am going to check a B12 and folate on him today. HEALTH MAINTENANCE    · Colon cancer screening. Colonoscopy is up-to-date performed in 2017 with 10-year follow-up planned. · Prostate cancer screening. PSA 1.7 on 12/13/2021. Immunization History   Administered Date(s) Administered    COVID-19, Moderna, Booster, PF, 50mcg/0.25ml 11/16/2021    COVID-19, Moderna, Primary or Immunocompromised, PF, 100mcg/0.5mL 04/26/2021, 05/24/2021    Influenza, High Dose (Fluzone 65 yrs and older) 10/20/2021       Orders Placed This Encounter   Procedures    Vitamin B12    Folate       Return in about 4 months (around 9/16/2022) for With [facility] possible phlebotomy.      Amaya Orlando PA-C  10:30 AM  5/16/2022

## 2022-05-16 ENCOUNTER — OFFICE VISIT (OUTPATIENT)
Dept: HEMATOLOGY | Age: 57
End: 2022-05-16
Payer: MEDICARE

## 2022-05-16 ENCOUNTER — HOSPITAL ENCOUNTER (OUTPATIENT)
Dept: INFUSION THERAPY | Age: 57
Discharge: HOME | End: 2022-05-16
Payer: MEDICARE

## 2022-05-16 VITALS
SYSTOLIC BLOOD PRESSURE: 148 MMHG | BODY MASS INDEX: 15.14 KG/M2 | HEART RATE: 76 BPM | OXYGEN SATURATION: 97 % | DIASTOLIC BLOOD PRESSURE: 72 MMHG | WEIGHT: 131 LBS

## 2022-05-16 DIAGNOSIS — D69.6 THROMBOCYTOPENIA, UNSPECIFIED (HCC): ICD-10-CM

## 2022-05-16 DIAGNOSIS — D75.89 MACROCYTOSIS: ICD-10-CM

## 2022-05-16 DIAGNOSIS — D75.1 POLYCYTHEMIA, SECONDARY: Primary | ICD-10-CM

## 2022-05-16 DIAGNOSIS — D75.1 POLYCYTHEMIA, SECONDARY: ICD-10-CM

## 2022-05-16 DIAGNOSIS — F17.210 CIGARETTE NICOTINE DEPENDENCE WITHOUT COMPLICATION: ICD-10-CM

## 2022-05-16 DIAGNOSIS — D72.820 LYMPHOCYTOSIS: ICD-10-CM

## 2022-05-16 LAB
BASOPHILS ABSOLUTE: 0.07 K/UL (ref 0.01–0.08)
BASOPHILS RELATIVE PERCENT: 0.9 % (ref 0.1–1.2)
EOSINOPHILS ABSOLUTE: 0.16 K/UL (ref 0.04–0.54)
EOSINOPHILS RELATIVE PERCENT: 2 % (ref 0.7–7)
FOLATE: 12.9 NG/ML (ref 4.5–32.2)
HCT VFR BLD CALC: 51.8 % (ref 40.1–51)
HEMOGLOBIN: 17.3 G/DL (ref 13.7–17.5)
LYMPHOCYTES ABSOLUTE: 3.39 K/UL (ref 1.18–3.74)
LYMPHOCYTES RELATIVE PERCENT: 42 % (ref 19.3–53.1)
MCH RBC QN AUTO: 34.2 PG (ref 25.7–32.2)
MCHC RBC AUTO-ENTMCNC: 33.4 G/DL (ref 32.3–36.5)
MCV RBC AUTO: 102.4 FL (ref 79–92.2)
MONOCYTES ABSOLUTE: 0.59 K/UL (ref 0.24–0.82)
MONOCYTES RELATIVE PERCENT: 7.3 % (ref 4.7–12.5)
NEUTROPHILS ABSOLUTE: 3.85 K/UL (ref 1.56–6.13)
NEUTROPHILS RELATIVE PERCENT: 47.7 % (ref 34–71.1)
PDW BLD-RTO: 14.5 % (ref 11.6–14.4)
PLATELET # BLD: 166 K/UL (ref 163–337)
PMV BLD AUTO: 11.8 FL (ref 7.4–10.4)
RBC # BLD: 5.06 M/UL (ref 4.63–6.08)
VITAMIN B-12: 482 PG/ML (ref 211–946)
WBC # BLD: 8.07 K/UL (ref 4.23–9.07)

## 2022-05-16 PROCEDURE — 85025 COMPLETE CBC W/AUTO DIFF WBC: CPT

## 2022-05-16 PROCEDURE — G8419 CALC BMI OUT NRM PARAM NOF/U: HCPCS | Performed by: PHYSICIAN ASSISTANT

## 2022-05-16 PROCEDURE — 1036F TOBACCO NON-USER: CPT | Performed by: PHYSICIAN ASSISTANT

## 2022-05-16 PROCEDURE — 3017F COLORECTAL CA SCREEN DOC REV: CPT | Performed by: PHYSICIAN ASSISTANT

## 2022-05-16 PROCEDURE — 99213 OFFICE O/P EST LOW 20 MIN: CPT | Performed by: PHYSICIAN ASSISTANT

## 2022-05-16 PROCEDURE — G8427 DOCREV CUR MEDS BY ELIG CLIN: HCPCS | Performed by: PHYSICIAN ASSISTANT

## 2022-05-16 PROCEDURE — 99212 OFFICE O/P EST SF 10 MIN: CPT

## 2022-05-16 ASSESSMENT — ENCOUNTER SYMPTOMS
WHEEZING: 0
TROUBLE SWALLOWING: 0
DIARRHEA: 0
ABDOMINAL DISTENTION: 0
COLOR CHANGE: 0
BACK PAIN: 1
ABDOMINAL PAIN: 0
PHOTOPHOBIA: 0
NAUSEA: 0
EYE DISCHARGE: 0
SORE THROAT: 0
CONSTIPATION: 0
VOMITING: 0
COUGH: 0
SHORTNESS OF BREATH: 0
VOICE CHANGE: 0
EYE ITCHING: 0
BLOOD IN STOOL: 0

## 2022-09-07 ENCOUNTER — TELEPHONE (OUTPATIENT)
Dept: HEMATOLOGY | Age: 57
End: 2022-09-07

## 2022-09-07 NOTE — TELEPHONE ENCOUNTER
Pt of Ashwin: Left vm for pt 9/12/22 appt moved to 9/23/22 at 11am due to Earlene Armas out of office.

## 2022-09-22 NOTE — PROGRESS NOTES
Progress Note      Pt Name: Javier Stein  YOB: 1965  MRN: [id number]    Date of evaluation: 9/23/2022  History Obtained From:  patient, electronic medical record    CHIEF COMPLAINT:    Chief Complaint   Patient presents with    Follow-up     Polycythemia, secondary     Current active problems  Secondary polycythemia   Thrombocytopenia    HISTORY OF PRESENT ILLNESS:    Javier Stein is a 65 y.o.  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 He does require periodic therapeutic phlebotomies. He has responded well to the therapeutic phlebotomies when needed. He has history of peripheral vascular disease requiring aortofemoral bypass by Dr. Kuhn Daily previously. Unfortunately he continues to smoke 1/3 pack/day. He denies any chest pain. He denies any headache, visual disturbances. She has a history of CVA in 1998 and has chronic right-sided weakness. He has a history of seizures, on Keppra 750 mg twice daily but has not had any recent seizure activity. He reports that his HIV status has continued to be stable-undetectable. He continues on his antiretrovirals. Blood pressure is stable on his metoprolol 25 mg twice daily. He continues on Lipitor 40 daily for his cholesterol. 1st HEMATOLOGY HISTORY: Secondary polycythemia  Hung was seen in initial hematology consultation on 10/30/2017 referred by Dr. Luba Murrya for evaluation treatment of polycythemia. CBC from 9/18/2017 revealed a WBC of 11.2, Hgb 19.5, HCT 55.2, ,000. SEROLOGY 9/18/2017  ALVARO 2 - negative for V617F and exon 14 mutation  CALR - negative  MPL - negative    CMP- CRT 1.45 with GFR 55 otherwise negative    He does smoke one half pack cigarettes per day. His initial CBC in the office revealed a WBC of 12.18 normal percent differential, Hgb 18.8, HCT 57.5, .2, ,000.     Serologic evaluation has been requested looking into his polycythemia which is most likely a secondary polycythemia secondary to tobacco abuse. We'll also look into the macrocytosis and mild thrombocytopenia. Therapeutic phlebotomies were initiated 250 cc. SEROLOGY 10/30/2017  B12 - 444  Folate - 11.1  EPO - 9.9  Serum Fe - 76  TIBC - 356  Fe Sat - 21%  Ferritin - 145    He stopped smoking 3/8/2021 at the time of his aortobifemoral bypass    TREATMENT SUMMARY  1. Therapeutic phlebotomy initiated 10/30/2017    2nd HEMATOLOGY HISTORY: Thrombocytopenia  He was seen in routine follow-up in the office on 2/19/2018 and his PLT had dropped to 147,000. SEROLOGY 2/19/2018   Copper - 120  Zinc - 71  SPEP - No M spike  Antiplatelet AB - Negative  MILAD positive RNP-1.6 (0-0.9) - appointment was made with Dr. Jaison Ferrari however he did not keep the appointment and ultimately chose not to go. Ultrasound spleen at [facility] on 5/21/2018 reveals borderline splenomegaly measuring 12.2 x 5.7 x 4.9 cm. Hepatitis A, B, C - A and C negative; B core antigen positive - followed by Dr. Dana Mckeon    Serology 6/1/2020  CMP - alk phos 155  B12 - 569  Folate - 12.4    3rd HEMATOLOGY HISTORY: Leukocytosis (lymphocytosis)    CBC on 10/5/2020 revealed WBC 15.66 with differential switching around to 50.3% lymphocytes and 39.2% neutrophils. ESR - 7  CRP - 18 (0-10)  BCR/ABL - no mutation detected ([facility])      Peripheral blood flow cytometry to [facility] 10/5/2020  No B-cell monoclonality  Decreased CD4: CD8 ratio (0.36: 1) -most likely due to underlying known HIV  No T-cell aberrant antigen expression.   (No increase in T-cell LGL)  Blasts are not increased      Past Medical History:   Diagnosis Date    Abnormal immunological finding in serum, unspecified     Adult BMI <19 kg/sq m     AIDS (acquired immune deficiency syndrome) (HCC)     Anxiety and depression     CAD (coronary artery disease)     MI    Carotid stenosis     CVA (cere[facility] vascular accident) [facility] [address]     Residual right-sided weakness and expressive aphasia    MI (myocardial infarction) ([facility] [address]     Nicotine dependence, unspecified, uncomplicated     Polycythemia, secondary     Positive MILAD (antinuclear antibody)     RNP, referred to rheumatology but did not keep appointment    Seizure [facility])     Thrombocytopenia, unspecified ([facility] [address]     Vitamin D deficiency        Past Surgical History:   Procedure Laterality Date    COLONOSCOPY  01/31/2017       Current Outpatient Medications:     alendronate (FOSAMAX) 70 MG tablet, Take 1 tablet by mouth weekly, Disp: , Rfl:     TRIUMEQ 600- MG TABS, 1 tablet by mouth daily; Take 2 hours before or 6 hours after Calcium, Antacids or Vitamins. , Disp: , Rfl: 2    HYDROcodone-acetaminophen (NORCO) 7.5-325 MG per tablet, Take 1 tablet by mouth.  Per patient take 2 times a day, Disp: , Rfl:     pregabalin (LYRICA) 150 MG capsule, Take 150 mg by mouth., Disp: , Rfl:     ASPIRIN LOW DOSE 81 MG EC tablet, TAKE 1 TABLET BY MOUTH DAILY, Disp: , Rfl: 2    cetirizine (ZYRTEC) 10 MG tablet, TAKE 1 TABLET BY MOUTH DAILY, Disp: , Rfl: 2    metoprolol tartrate (LOPRESSOR) 25 MG tablet, TAKE 1 TABLET BY MOUTH TWICE DAILY, Disp: , Rfl: 2    loperamide (IMODIUM) 2 MG capsule, TAKE 1 CAPSULE BY MOUTH THREE TIMES DAILY BEFORE MEALS, Disp: , Rfl: 2    potassium chloride (KLOR-CON M) 20 MEQ extended release tablet, Take 20 mEq by mouth, Disp: , Rfl:     levETIRAcetam (KEPPRA) 750 MG tablet, TAKE 2 TABLETS BY MOUTH TWICE DAILY, Disp: , Rfl: 2    atorvastatin (LIPITOR) 40 MG tablet, TAKE 1 TABLET BY MOUTH DAILY, Disp: , Rfl: 2    Omega-3 Fatty Acids (RA FISH OIL) 900 MG CAPS, TAKE 2 CAPSULE BY MOUTH TWICE DAILY, Disp: , Rfl:     nitroGLYCERIN (NITROSTAT) 0.4 MG SL tablet, Place 0.4 mg under the tongue, Disp: , Rfl:     ergocalciferol (ERGOCALCIFEROL) 1.25 MG (34210 UT) capsule, Take 50,000 Units by mouth every 28 days, Disp: , Rfl:         Allergies   Allergen Reactions    Fenofibrate     Niacin And Related           Social History scleral icterus. Conjunctiva/sclera: Conjunctivae normal.   Neck:      Vascular: No JVD. Trachea: No tracheal deviation. Cardiovascular:      Rate and Rhythm: Normal rate and regular rhythm. Heart sounds: Normal heart sounds. No murmur heard. Pulmonary:      Effort: Pulmonary effort is normal. No respiratory distress. Breath sounds: Normal breath sounds. No wheezing. Chest:   Breasts:     Right: No axillary adenopathy or supraclavicular adenopathy. Left: No axillary adenopathy or supraclavicular adenopathy. Abdominal:      General: Bowel sounds are normal. There is no distension. Palpations: Abdomen is soft. There is no hepatomegaly, splenomegaly or mass. Tenderness: There is no abdominal tenderness. Musculoskeletal:         General: Deformity (Right arm, wearing brace right leg) present. No tenderness. Lymphadenopathy:      Cervical: No cervical adenopathy. Upper Body:      Right upper body: No supraclavicular or axillary adenopathy. Left upper body: No supraclavicular or axillary adenopathy. Lower Body: No right inguinal adenopathy. No left inguinal adenopathy. Skin:     Findings: No erythema or rash. Neurological:      Mental Status: He is alert and oriented to person, place, and time. Motor: Abnormal muscle tone (Right arm and leg) present. Coordination: Coordination abnormal.   Psychiatric:         Thought Content: Thought content normal.        CBC reviewed by me  Lab Results   Component Value Date    WBC 10.36 (H) 09/23/2022    HGB 17.3 09/23/2022    HCT 53.1 (HH) 09/23/2022    .6 (H) 09/23/2022     09/23/2022       VISIT DIAGNOSIS  1. Polycythemia, secondary    2. Thrombocytopenia, unspecified (HCC)    3. Lymphocytosis    4. Macrocytosis    5. Cigarette nicotine dependence without complication        ASSESSMENT/PLAN:      1. Secondary polycythemia. HCT 53.1.   Due to prior history of CVA he will get a 2 and 50 cc phlebotomy today. 2. Thrombocytopenia. PLT today is normal at 183,000. 3.  Lymphocytosis        WBC 10.36 with 53.6% lymphocyte today. Physical examination does not reveal any peripheral lymphadenopathy, splenomegaly. Wt Readings from Last 3 Encounters:   09/23/22 129 lb 11.2 oz (58.8 kg)   05/16/22 131 lb (59.4 kg)   02/15/22 134 lb 9.6 oz (61.1 kg)     His weight is down slightly. This will continue to be monitored. 4.  Nicotine dependence. 2 view chest x-ray 3/5/2021 at [facility] revealed chronic changes with associated hyperinflation. No evidence of acute cardiopulmonary process. He continues to smoke a third a pack of cigarettes per day. I again discussed risk and benefits reasons behind getting surveillance low-dose CT imaging of the chest.  He again declined to have radiographic imaging at this point and wants to think about it. 5.  Macrocytosis    Serology 5/16/2022  Folate - 12.9  B12 - 482        . 6-we will continue to be monitored    HEALTH MAINTENANCE    Colon cancer screening. Colonoscopy is up-to-date performed in 2017 with 10-year follow-up planned. Prostate cancer screening. PSA 1.7 on 12/13/2021. Immunization History   Administered Date(s) Administered    COVID-19, MODERNA BLUE border, Primary or Immunocompromised, (age 12y+), IM, 100 mcg/0.5mL 04/26/2021, 05/24/2021    COVID-19, MODERNA Booster BLUE border, (age 18y+), IM, 50mcg/0.25mL 11/16/2021, 07/15/2022    Influenza, High Dose (Fluzone 65 yrs and older) 10/20/2021       Orders this encounter  250 cc therapeutic phlebotomy      Return in about 6 months (around 3/23/2023) for With Earlene Armas.      Neema Dominguez PA-C  1:57 PM  9/23/2022 Initial (On Arrival)

## 2022-09-23 ENCOUNTER — OFFICE VISIT (OUTPATIENT)
Dept: HEMATOLOGY | Age: 57
End: 2022-09-23
Payer: MEDICARE

## 2022-09-23 ENCOUNTER — HOSPITAL ENCOUNTER (OUTPATIENT)
Dept: INFUSION THERAPY | Age: 57
Discharge: HOME | End: 2022-09-23
Payer: MEDICARE

## 2022-09-23 VITALS
HEIGHT: 78 IN | HEART RATE: 73 BPM | BODY MASS INDEX: 15.01 KG/M2 | OXYGEN SATURATION: 100 % | WEIGHT: 129.7 LBS | DIASTOLIC BLOOD PRESSURE: 78 MMHG | SYSTOLIC BLOOD PRESSURE: 126 MMHG

## 2022-09-23 DIAGNOSIS — D75.89 MACROCYTOSIS: ICD-10-CM

## 2022-09-23 DIAGNOSIS — F17.210 CIGARETTE NICOTINE DEPENDENCE WITHOUT COMPLICATION: ICD-10-CM

## 2022-09-23 DIAGNOSIS — D69.6 THROMBOCYTOPENIA, UNSPECIFIED (HCC): ICD-10-CM

## 2022-09-23 DIAGNOSIS — D75.1 POLYCYTHEMIA, SECONDARY: ICD-10-CM

## 2022-09-23 DIAGNOSIS — D75.1 POLYCYTHEMIA, SECONDARY: Primary | ICD-10-CM

## 2022-09-23 DIAGNOSIS — D72.820 LYMPHOCYTOSIS: ICD-10-CM

## 2022-09-23 DIAGNOSIS — D75.89 MACROCYTOSIS: Primary | ICD-10-CM

## 2022-09-23 LAB
BASOPHILS ABSOLUTE: 0.1 K/UL (ref 0.01–0.08)
BASOPHILS RELATIVE PERCENT: 1 % (ref 0.1–1.2)
EOSINOPHILS ABSOLUTE: 0.29 K/UL (ref 0.04–0.54)
EOSINOPHILS RELATIVE PERCENT: 2.8 % (ref 0.7–7)
HCT VFR BLD CALC: 53.1 % (ref 40.1–51)
HEMOGLOBIN: 17.3 G/DL (ref 13.7–17.5)
LYMPHOCYTES ABSOLUTE: 5.55 K/UL (ref 1.18–3.74)
LYMPHOCYTES RELATIVE PERCENT: 53.6 % (ref 19.3–53.1)
MCH RBC QN AUTO: 35.4 PG (ref 25.7–32.2)
MCHC RBC AUTO-ENTMCNC: 32.6 G/DL (ref 32.3–36.5)
MCV RBC AUTO: 108.6 FL (ref 79–92.2)
MONOCYTES ABSOLUTE: 0.72 K/UL (ref 0.24–0.82)
MONOCYTES RELATIVE PERCENT: 6.9 % (ref 4.7–12.5)
NEUTROPHILS ABSOLUTE: 3.66 K/UL (ref 1.56–6.13)
NEUTROPHILS RELATIVE PERCENT: 35.3 % (ref 34–71.1)
PDW BLD-RTO: 12.8 % (ref 11.6–14.4)
PLATELET # BLD: 183 K/UL (ref 163–337)
PMV BLD AUTO: 11.5 FL (ref 7.4–10.4)
RBC # BLD: 4.89 M/UL (ref 4.63–6.08)
WBC # BLD: 10.36 K/UL (ref 4.23–9.07)

## 2022-09-23 PROCEDURE — G8419 CALC BMI OUT NRM PARAM NOF/U: HCPCS | Performed by: PHYSICIAN ASSISTANT

## 2022-09-23 PROCEDURE — 3017F COLORECTAL CA SCREEN DOC REV: CPT | Performed by: PHYSICIAN ASSISTANT

## 2022-09-23 PROCEDURE — 99213 OFFICE O/P EST LOW 20 MIN: CPT | Performed by: PHYSICIAN ASSISTANT

## 2022-09-23 PROCEDURE — 85025 COMPLETE CBC W/AUTO DIFF WBC: CPT

## 2022-09-23 PROCEDURE — 99211 OFF/OP EST MAY X REQ PHY/QHP: CPT

## 2022-09-23 PROCEDURE — 99195 PHLEBOTOMY: CPT

## 2022-09-23 PROCEDURE — 1036F TOBACCO NON-USER: CPT | Performed by: PHYSICIAN ASSISTANT

## 2022-09-23 PROCEDURE — G8427 DOCREV CUR MEDS BY ELIG CLIN: HCPCS | Performed by: PHYSICIAN ASSISTANT

## 2022-09-23 ASSESSMENT — ENCOUNTER SYMPTOMS
TROUBLE SWALLOWING: 0
ABDOMINAL DISTENTION: 0
SHORTNESS OF BREATH: 0
EYE DISCHARGE: 0
COUGH: 0
ABDOMINAL PAIN: 0
BLOOD IN STOOL: 0
VOMITING: 0
COLOR CHANGE: 0
VOICE CHANGE: 0
NAUSEA: 0
DIARRHEA: 0
CONSTIPATION: 0
PHOTOPHOBIA: 0
WHEEZING: 0
EYE ITCHING: 0
BACK PAIN: 1
SORE THROAT: 0

## 2022-09-23 NOTE — PROGRESS NOTES
THERAPEUTIC PHLEBOTOMY    Most recent Hemoglobin 17.3Gm/dl and Hematocrit 53.1%    Date obtained: 09 /23 /2023      Pre-phlebotomy Vital Signs: Refer to Doc flow sheet    Start time: 11:40    Tourniquet placed on patient's arm and arm palpated for venous access. Area of venous access cleansed with alcohol. Sheath removed from the needle and needle inserted into the left antecubital site. Blood flowing well down the tubing into collection bag. Adjustment/no adjustment of needle needed to maintain adequate blood flow. Scale monitoring amount of blood in bag. Stop Time: 11:50  Needle removed from patient's arm. Pressure applied to patient's arm until bleeding stopped. Dry sterile dressing applied over puncture site and secured with Coban/self-adherent wrap. Final amount of blood removed: 250cc    Patient tolerated procedure well. No redness, edema, or signs of active bleeding noted. Discharge Instructions provided: No heavy pushing or lifting for the next 24 hours. Keep dressing dry and in place for 4 to 6 hours. If a fever GREATER than 100.5 develops, contact office. Patient discharged ambulatory with belongings.

## 2023-03-24 ENCOUNTER — TELEPHONE (OUTPATIENT)
Dept: INFUSION THERAPY | Age: 58
End: 2023-03-24